# Patient Record
Sex: FEMALE | Race: WHITE | NOT HISPANIC OR LATINO | Employment: FULL TIME | URBAN - METROPOLITAN AREA
[De-identification: names, ages, dates, MRNs, and addresses within clinical notes are randomized per-mention and may not be internally consistent; named-entity substitution may affect disease eponyms.]

---

## 2019-06-11 ENCOUNTER — EMPLOYEE HEALTH (OUTPATIENT)
Dept: OCCUPATIONAL MEDICINE | Facility: CLINIC | Age: 34
End: 2019-06-11

## 2019-06-11 ENCOUNTER — HOSPITAL ENCOUNTER (OUTPATIENT)
Facility: MEDICAL CENTER | Age: 34
End: 2019-06-11
Attending: PREVENTIVE MEDICINE
Payer: COMMERCIAL

## 2019-06-11 ENCOUNTER — EH NON-PROVIDER (OUTPATIENT)
Dept: OCCUPATIONAL MEDICINE | Facility: CLINIC | Age: 34
End: 2019-06-11

## 2019-06-11 VITALS
WEIGHT: 139 LBS | OXYGEN SATURATION: 94 % | SYSTOLIC BLOOD PRESSURE: 120 MMHG | DIASTOLIC BLOOD PRESSURE: 84 MMHG | HEART RATE: 60 BPM | BODY MASS INDEX: 22.34 KG/M2 | TEMPERATURE: 97.7 F | HEIGHT: 66 IN

## 2019-06-11 DIAGNOSIS — Z02.1 PRE-EMPLOYMENT HEALTH SCREENING EXAMINATION: ICD-10-CM

## 2019-06-11 DIAGNOSIS — Z02.1 PRE-EMPLOYMENT DRUG SCREENING: ICD-10-CM

## 2019-06-11 LAB
AMP AMPHETAMINE: NORMAL
BAR BARBITURATES: NORMAL
BZO BENZODIAZEPINES: NORMAL
COC COCAINE: NORMAL
INT CON NEG: NORMAL
INT CON POS: NORMAL
MDMA ECSTASY: NORMAL
MET METHAMPHETAMINES: NORMAL
MTD METHADONE: NORMAL
OPI OPIATES: NORMAL
OXY OXYCODONE: NORMAL
PCP PHENCYCLIDINE: NORMAL
POC URINE DRUG SCREEN OCDRS: NEGATIVE
THC: NORMAL

## 2019-06-11 PROCEDURE — 94375 RESPIRATORY FLOW VOLUME LOOP: CPT | Performed by: PREVENTIVE MEDICINE

## 2019-06-11 PROCEDURE — 86480 TB TEST CELL IMMUN MEASURE: CPT | Performed by: PREVENTIVE MEDICINE

## 2019-06-11 PROCEDURE — 8915 PR COMPREHENSIVE PHYSICAL: Performed by: PREVENTIVE MEDICINE

## 2019-06-11 PROCEDURE — 80305 DRUG TEST PRSMV DIR OPT OBS: CPT | Performed by: PREVENTIVE MEDICINE

## 2019-06-11 ASSESSMENT — VISUAL ACUITY
OS_CC: 20/20
OD_CC: 20/15

## 2019-06-12 LAB
GAMMA INTERFERON BACKGROUND BLD IA-ACNC: 0.03 IU/ML
M TB IFN-G BLD-IMP: NEGATIVE
M TB IFN-G CD4+ BCKGRND COR BLD-ACNC: 0 IU/ML
MITOGEN IGNF BCKGRD COR BLD-ACNC: >10 IU/ML
QFT TB2 - NIL TBQ2: 0 IU/ML

## 2019-06-14 ENCOUNTER — EH NON-PROVIDER (OUTPATIENT)
Dept: OCCUPATIONAL MEDICINE | Facility: CLINIC | Age: 34
End: 2019-06-14

## 2019-06-14 DIAGNOSIS — Z71.85 IMMUNIZATION COUNSELING: ICD-10-CM

## 2019-06-14 NOTE — PROGRESS NOTES
Pre-employment medical surveillance reviewed and signed. Quantiferon result documented in immunizations. Document returned to assigned MA.

## 2019-08-16 ENCOUNTER — HOSPITAL ENCOUNTER (OUTPATIENT)
Dept: LAB | Facility: MEDICAL CENTER | Age: 34
End: 2019-08-16
Payer: COMMERCIAL

## 2019-09-19 ENCOUNTER — HOSPITAL ENCOUNTER (OUTPATIENT)
Dept: LAB | Facility: MEDICAL CENTER | Age: 34
End: 2019-09-19
Payer: COMMERCIAL

## 2019-09-19 LAB
BDY FAT % MEASURED: 22 %
BP DIAS: 75 MMHG
BP SYS: 118 MMHG
CHOLEST SERPL-MCNC: 193 MG/DL (ref 100–199)
DIABETES HTDIA: NO
EVENT NAME HTEVT: NORMAL
FASTING HTFAS: YES
GLUCOSE SERPL-MCNC: 71 MG/DL (ref 65–99)
HDLC SERPL-MCNC: 108 MG/DL
HYPERTENSION HTHYP: NO
LDLC SERPL CALC-MCNC: 70 MG/DL
SCREENING LOC CITY HTCIT: NORMAL
SCREENING LOC STATE HTSTA: NORMAL
SCREENING LOCATION HTLOC: NORMAL
SMOKING HTSMO: NO
SUBSCRIBER ID HTSID: NORMAL
TRIGL SERPL-MCNC: 73 MG/DL (ref 0–149)

## 2019-09-19 PROCEDURE — 82947 ASSAY GLUCOSE BLOOD QUANT: CPT

## 2019-09-19 PROCEDURE — S5190 WELLNESS ASSESSMENT BY NONPH: HCPCS

## 2019-09-19 PROCEDURE — 36415 COLL VENOUS BLD VENIPUNCTURE: CPT

## 2019-09-19 PROCEDURE — 80061 LIPID PANEL: CPT

## 2019-10-06 ENCOUNTER — NON-PROVIDER VISIT (OUTPATIENT)
Dept: URGENT CARE | Facility: CLINIC | Age: 34
End: 2019-10-06
Payer: COMMERCIAL

## 2019-10-06 DIAGNOSIS — Z23 NEED FOR INFLUENZA VACCINATION: ICD-10-CM

## 2019-10-06 PROCEDURE — 90471 IMMUNIZATION ADMIN: CPT | Performed by: PHYSICIAN ASSISTANT

## 2019-10-06 PROCEDURE — 90686 IIV4 VACC NO PRSV 0.5 ML IM: CPT | Performed by: PHYSICIAN ASSISTANT

## 2019-10-14 ENCOUNTER — DOCUMENTATION (OUTPATIENT)
Dept: OCCUPATIONAL MEDICINE | Facility: CLINIC | Age: 34
End: 2019-10-14

## 2019-10-16 ENCOUNTER — EH NON-PROVIDER (OUTPATIENT)
Dept: OCCUPATIONAL MEDICINE | Facility: CLINIC | Age: 34
End: 2019-10-16

## 2019-10-16 DIAGNOSIS — Z02.89 ENCOUNTER FOR OCCUPATIONAL HEALTH EXAMINATION INVOLVING RESPIRATOR: ICD-10-CM

## 2019-10-16 PROCEDURE — 94375 RESPIRATORY FLOW VOLUME LOOP: CPT | Performed by: NURSE PRACTITIONER

## 2019-12-03 ENCOUNTER — APPOINTMENT (OUTPATIENT)
Dept: DERMATOLOGY | Facility: IMAGING CENTER | Age: 34
End: 2019-12-03

## 2019-12-27 ENCOUNTER — HOSPITAL ENCOUNTER (OUTPATIENT)
Dept: RADIOLOGY | Facility: MEDICAL CENTER | Age: 34
End: 2019-12-27
Attending: PHYSICIAN ASSISTANT
Payer: COMMERCIAL

## 2019-12-27 ENCOUNTER — OFFICE VISIT (OUTPATIENT)
Dept: URGENT CARE | Facility: PHYSICIAN GROUP | Age: 34
End: 2019-12-27
Payer: COMMERCIAL

## 2019-12-27 DIAGNOSIS — J22 LOWER RESP. TRACT INFECTION: ICD-10-CM

## 2019-12-27 DIAGNOSIS — R05.9 COUGH: ICD-10-CM

## 2019-12-27 PROCEDURE — 71046 X-RAY EXAM CHEST 2 VIEWS: CPT

## 2019-12-27 RX ORDER — DOXYCYCLINE HYCLATE 100 MG
100 TABLET ORAL 2 TIMES DAILY
Qty: 20 TAB | Refills: 0 | Status: SHIPPED | OUTPATIENT
Start: 2019-12-27 | End: 2020-01-06

## 2019-12-27 NOTE — PROGRESS NOTES
Pt 35 y/o female- well known to myself- cough, congestion, sore throat, and intermittent wheezing for the last month-symptoms are waxing and waning in nature.  Reports 2 days ago symptoms significantly worsened as she was in bed all day.    Strep and flu negative in clinic.  Coughing throughout duration of the shift today.     POX 98% on RA.   Lungs- coarse rhonchi left lower lung field.   CXR- clear.     Will tx for lower resp. Infection.   Doxycycline for 10 days written- sent to pharmacy.

## 2020-03-11 ENCOUNTER — TELEPHONE (OUTPATIENT)
Dept: SCHEDULING | Facility: IMAGING CENTER | Age: 35
End: 2020-03-11

## 2020-03-11 ENCOUNTER — OFFICE VISIT (OUTPATIENT)
Dept: MEDICAL GROUP | Facility: MEDICAL CENTER | Age: 35
End: 2020-03-11
Payer: COMMERCIAL

## 2020-03-11 VITALS
HEART RATE: 80 BPM | HEIGHT: 66 IN | DIASTOLIC BLOOD PRESSURE: 70 MMHG | WEIGHT: 140 LBS | OXYGEN SATURATION: 91 % | BODY MASS INDEX: 22.5 KG/M2 | TEMPERATURE: 98.3 F | SYSTOLIC BLOOD PRESSURE: 112 MMHG

## 2020-03-11 DIAGNOSIS — Z00.00 ROUTINE GENERAL MEDICAL EXAMINATION AT A HEALTH CARE FACILITY: ICD-10-CM

## 2020-03-11 DIAGNOSIS — L70.0 ACNE VULGARIS: ICD-10-CM

## 2020-03-11 DIAGNOSIS — Z15.09 BRCA2 GENE MUTATION POSITIVE: ICD-10-CM

## 2020-03-11 DIAGNOSIS — F32.A DEPRESSION, UNSPECIFIED DEPRESSION TYPE: ICD-10-CM

## 2020-03-11 DIAGNOSIS — Z15.01 BRCA2 GENE MUTATION POSITIVE: ICD-10-CM

## 2020-03-11 PROCEDURE — 99395 PREV VISIT EST AGE 18-39: CPT | Performed by: INTERNAL MEDICINE

## 2020-03-11 RX ORDER — BUPROPION HYDROCHLORIDE 150 MG/1
150 TABLET, EXTENDED RELEASE ORAL 2 TIMES DAILY
Qty: 60 TAB | Refills: 11 | Status: SHIPPED | OUTPATIENT
Start: 2020-03-11 | End: 2020-03-13

## 2020-03-11 SDOH — HEALTH STABILITY: MENTAL HEALTH: HOW MANY STANDARD DRINKS CONTAINING ALCOHOL DO YOU HAVE ON A TYPICAL DAY?: 1 OR 2

## 2020-03-11 ASSESSMENT — PATIENT HEALTH QUESTIONNAIRE - PHQ9
SUM OF ALL RESPONSES TO PHQ QUESTIONS 1-9: 6
CLINICAL INTERPRETATION OF PHQ2 SCORE: 1
5. POOR APPETITE OR OVEREATING: 0 - NOT AT ALL

## 2020-03-11 NOTE — LETTER
[facility]  Bebo Ayala M.D.  [address]  [facility] [address]  Fax: [phone]   Authorization for Release/Disclosure of   Protected Health Information   Name: AMERICA TRUJILLO : 1985 SSN: [id number]   [facility] [address] Phone:    [phone] (home)    I authorize the entity listed below to release/disclose the PHI below to:   [facility]/Bebo Ayala M.D. and Bebo Ayala M.D.   Provider or Entity Name: [facility]       Address [address], [place] [address]    Phone:   [phone]       Fax:      Reason for request: continuity of care   Information to be released:    [  ] LAST COLONOSCOPY,  including any PATH REPORT and follow-up  [  ] LAST FIT/COLOGUARD RESULT [  ] LAST DEXA  [  ] LAST MAMMOGRAM  [  ] LAST PAP  [  ] LAST LABS [  ] RETINA EXAM REPORT  [  ] IMMUNIZATION RECORDS  [ X ] Release all info      [  ] Check here and initial the line next to each item to release ALL health information INCLUDING  _____ Care and treatment for drug and / or alcohol abuse  _____ HIV testing, infection status, or AIDS  _____ Genetic Testing    DATES OF SERVICE OR TIME PERIOD TO BE DISCLOSED: _____________  I understand and acknowledge that:  * This Authorization may be revoked at any time by you in writing, except if your health information has already been used or disclosed.  * Your health information that will be used or disclosed as a result of you signing this authorization could be re-disclosed by the recipient. If this occurs, your re-disclosed health information may no longer be protected by State or Federal laws.  * You may refuse to sign this Authorization. Your refusal will not affect your ability to obtain treatment.  * This Authorization becomes effective upon signing and will  on (date) __________.      If no date is indicated, this Authorization will  one (1) year from the signature date.       Name: Saritha Leiva    Signature:   Date:     3/11/2020       PLEASE FAX REQUESTED RECORDS BACK TO: [phone]

## 2020-03-11 NOTE — ASSESSMENT & PLAN NOTE
She has a BRCA2 gene and has had prophylactic bilateral mastectomy.  She would like to be followed by a gynecologist.

## 2020-03-11 NOTE — ASSESSMENT & PLAN NOTE
She has a history of episodes of depression.  She has been on many different antidepressants.  The one that seems to work best is Wellbutrin SR and she would like to take 150 mg once or twice a day.

## 2020-03-11 NOTE — PROGRESS NOTES
Chief Complaint:     Saritha Leiva is a 34 y.o. female who presents for annual exam and to establish herself in this office and for follow-up of depression and BRCA2 gene    BRCA2 gene mutation positive  She has a BRCA2 gene and has had prophylactic bilateral mastectomy.  She would like to be followed by a gynecologist.    Depression  She has a history of episodes of depression.  She has been on many different antidepressants.  The one that seems to work best is Wellbutrin SR and she would like to take 150 mg once or twice a day.    Acne vulgaris  She has chronic problems with acne.  She will be calling a dermatologist for follow-up.      Pt has GYN provider: yes  Last colonoscopy: Not yet  Bone density test no  Gardiasil:yes   Last Td: 6/2/10  Regular exercise: yes     She  has a past medical history of Acne vulgaris (3/11/2020), Anesthesia, Depression, and Pain. She also has no past medical history of Anginal syndrome (HCC), Arrhythmia, Asthma, At risk for sleep apnea, Blood clotting disorder (HCC), Breath shortness, Bronchitis, Cancer (HCC), Carcinoma in situ of respiratory system, Cold, Congestive heart failure (HCC), COPD (chronic obstructive pulmonary disease) (HCC), Coughing blood, Dental disorder, Diabetes (HCC), Disorder of thyroid, Emphysema of lung (HCC), Gynecological disorder, Heart murmur, Heart valve disease, Hemorrhagic disorder (HCC), Hepatitis A, Hepatitis B, Hepatitis C, High cholesterol, Hypertension, Myocardial infarct (HCC), Pacemaker, Pneumonia, Pregnant, Rheumatic fever, Seizure (HCC), Sleep apnea, Snoring, Stroke (HCC), Tuberculosis, Urinary bladder disorder, or Urinary incontinence.  She  has a past surgical history that includes acl reconstruction w/ anterolateral ligament repair (Right, 12/22/2015); meniscus repair (12/22/2015); knee arthroscopy with bone tunnel grafting (12/22/2015); acl reconstruction w/ anterolateral ligament repair (Right, 3/29/2016); acl repair (Bilateral);  and mastectomy bilateral subq.  Current Outpatient Medications   Medication Sig Dispense Refill   • buPROPion SR (WELLBUTRIN-SR) 150 MG TABLET SR 12 HR sustained-release tablet Take 1 Tab by mouth 2 times a day. 60 Tab 11   • norethindrone (MICRONOR) 0.35 MG tablet Take 1 Tab by mouth every day.     • diazepam (VALIUM) 5 MG Tab Take 1 Tab by mouth every 6 hours as needed (muscle spasms). (Patient not taking: Reported on 6/11/2019) 15 Tab 0     No current facility-administered medications for this visit.      Social History     Tobacco Use   • Smoking status: Never Smoker   • Smokeless tobacco: Never Used   Substance Use Topics   • Alcohol use: Yes     Alcohol/week: 1.2 oz     Types: 2 Glasses of wine per week     Drinks per session: 1 or 2     Comment: occas   • Drug use: No         ROS:   General:  no recent change in strength, weight, appetite, or exercise tolerance.  CNS: no significant lightheadedness, headaches, fainting spells, seizures, or change in mentation.  EENT: no significant change in vision, hearing, sense of smell, swallowing, or voice.  RESP: no significant wheezing, coughing, or dyspnea.  Breasts: No visual changes, masses, retractions or discharge.  CV: no significant palpitations or chest pain.  G.I.: no significant indigestion, abdominal pain, or change in bowel habits.  : no significant change in urinating, no dysuria or hematuria.  GYN: no significant vaginal discharge, unusual bleeding, discomfort, pelvic pain, or change in sexual function.  EXTREM:  no significant edema, swelling, pain, or limitations of motion.  INTEG: no significant change in skin, hair or fingernails.  LYMPH: no significant adenopathy or other masses.  PSYCH: no significant anxiety or depression.      PHYSICAL EXAMINATION:  Body mass index is 22.6 kg/m².  Wt Readings from Last 4 Encounters:   03/11/20 63.5 kg (140 lb)   03/29/16 64.4 kg (141 lb 15.6 oz)   12/22/15 65.4 kg (144 lb 2.9 oz)   06/02/12 64.4 kg (142 lb)  "      PE:  /70 (BP Location: Left arm, Patient Position: Sitting)   Pulse 80   Temp 36.8 °C (98.3 °F)   Ht 1.676 m (5' 6\")   Wt 63.5 kg (140 lb)   SpO2 91%   BMI 22.60 kg/m²     GEN: clean, well groomed, in no acute distress, alert.  EENT: PERRL, normal EOMs, fundi unremarkable, normal external auditory canals and tympanic membranes, pharynx unremarkable, dentition satisfactory, nose unremarkable, neck supple and without significant lymphadenopathy or masses, trachea midline, thyroid unremarkable.  LUNGS: bilateral breath sounds, without significant wheezes or rales or abnormalities in percussion.  CV:  peripheral circulation is satisfactory, pedal pulses are satisfactory, heart sounds are unremarkable.  ABD: soft, without significant masses, no hepatosplenomegaly, no tenderness, bowel sounds are normal, no significant inguinal adenopathy.  Breast, pelvic, and rectal exams were not performed.  Again, she has had bilateral mastectomy.    EXTREM:  without significant edema, cyanosis or deformity.  LYMPH:  no significant lymphadenopathy or lymphedema.  INTEG:  skin, hair, fingernails are unremarkable without significant rashes or lesions  NEURO:  essentially normal sensation, strength, gate, and cerebellar function, normal DTRs, affect is normal, oriented times three.  PSYCH: appropriate affect and mood.    ASSESSMENT/PLAN:  1. BRCA2 gene mutation positive  REFERRAL TO GYNECOLOGY    Follow-up with gynecologist.  We discussed dangers of ovarian cancer.   2. Depression, unspecified depression type  REFERRAL TO BEHAVIORAL HEALTH    I gave her a prescription for Wellbutrin  mg to be taken once or twice daily.  We discussed potential side effects.  Follow-up in 3 months if not sooner.  In the meantime she was referred to behavioral health services for counseling.  She reports that that often helps.   3. Routine general medical examination at a health care facility     4. Acne vulgaris      She will be " following up with a dermatologist.       Labs per orders  Immunizations per orders  Pt counseled about skin care, diet, supplements, and exercise.  Next office visit for recheck of chronic medical conditions is due in 3 months

## 2020-03-13 RX ORDER — BUPROPION HYDROCHLORIDE 150 MG/1
150 TABLET, EXTENDED RELEASE ORAL 2 TIMES DAILY
Qty: 60 TAB | Refills: 11 | Status: SHIPPED | OUTPATIENT
Start: 2020-03-13 | End: 2021-01-21

## 2020-03-20 ENCOUNTER — OFFICE VISIT (OUTPATIENT)
Dept: DERMATOLOGY | Facility: IMAGING CENTER | Age: 35
End: 2020-03-20
Payer: COMMERCIAL

## 2020-03-20 ENCOUNTER — TELEPHONE (OUTPATIENT)
Dept: DERMATOLOGY | Facility: IMAGING CENTER | Age: 35
End: 2020-03-20

## 2020-03-20 VITALS — TEMPERATURE: 98.4 F | BODY MASS INDEX: 21.86 KG/M2 | HEIGHT: 66 IN | WEIGHT: 136 LBS

## 2020-03-20 DIAGNOSIS — Z51.81 MEDICATION MONITORING ENCOUNTER: ICD-10-CM

## 2020-03-20 DIAGNOSIS — L70.0 ACNE VULGARIS: ICD-10-CM

## 2020-03-20 LAB
INT CON NEG: NEGATIVE
INT CON POS: POSITIVE
POC URINE PREGNANCY TEST: NEGATIVE

## 2020-03-20 PROCEDURE — 99202 OFFICE O/P NEW SF 15 MIN: CPT | Performed by: NURSE PRACTITIONER

## 2020-03-20 PROCEDURE — 81025 URINE PREGNANCY TEST: CPT | Performed by: NURSE PRACTITIONER

## 2020-03-20 ASSESSMENT — ENCOUNTER SYMPTOMS
CHILLS: 0
FEVER: 0
DIAPHORESIS: 0
WEIGHT LOSS: 0

## 2020-03-20 NOTE — PROGRESS NOTES
Dermatology New Patient Visit    Chief Complaint   Patient presents with   • Establish Care       Subjective:     HPI:   Saritha Leiva is a 34 y.o. female presenting for    Acne  Started: 8 months ago it restarted. Previously on Accutane  Active on: face  Aggravated by: dairy  Treatment currently used: no  Prior treatments used:accutane, last cycle was around 3851-0773   Face wash/moisturizer: cerave  Family history of scarring acne: no  Contraception: IUD, mirena  Family h/o breast/uterine/ovarian cancers: Yes, mom breast cancer. Pt had a prophylactic mastectomy because she is BRCA 2 positive.          Past Medical History:   Diagnosis Date   • Acne vulgaris 3/11/2020   • Anesthesia     ponv,motion sickness   • Depression    • Pain        Current Outpatient Medications on File Prior to Visit   Medication Sig Dispense Refill   • buPROPion SR (WELLBUTRIN-SR) 150 MG TABLET SR 12 HR sustained-release tablet Take 1 Tab by mouth 2 times a day. 60 Tab 11   • norethindrone (MICRONOR) 0.35 MG tablet Take 1 Tab by mouth every day.     • diazepam (VALIUM) 5 MG Tab Take 1 Tab by mouth every 6 hours as needed (muscle spasms). (Patient not taking: Reported on 6/11/2019) 15 Tab 0     No current facility-administered medications on file prior to visit.        Allergies   Allergen Reactions   • Bactrim [Sulfamethoxazole W-Trimethoprim]      rash       Family History   Problem Relation Age of Onset   • Cancer Mother         Stage 4 Breast   • Other Father         Bi Polar   • Hypertension Father        Social History     Socioeconomic History   • Marital status: Single     Spouse name: Not on file   • Number of children: Not on file   • Years of education: Not on file   • Highest education level: Not on file   Occupational History   • Not on file   Social Needs   • Financial resource strain: Not on file   • Food insecurity     Worry: Not on file     Inability: Not on file   • Transportation needs     Medical: Not on file      "Non-medical: Not on file   Tobacco Use   • Smoking status: Never Smoker   • Smokeless tobacco: Never Used   Substance and Sexual Activity   • Alcohol use: Yes     Alcohol/week: 1.2 oz     Types: 2 Glasses of wine per week     Drinks per session: 1 or 2     Comment: occas   • Drug use: No   • Sexual activity: Not on file   Lifestyle   • Physical activity     Days per week: Not on file     Minutes per session: Not on file   • Stress: Not on file   Relationships   • Social connections     Talks on phone: Not on file     Gets together: Not on file     Attends [employer] service: Not on file     Active member of club or organization: Not on file     Attends meetings of clubs or organizations: Not on file     Relationship status: Not on file   • Intimate partner violence     Fear of current or ex partner: Not on file     Emotionally abused: Not on file     Physically abused: Not on file     Forced sexual activity: Not on file   Other Topics Concern   • Not on file   Social History Narrative   • Not on file       Review of Systems   Constitutional: Negative for chills, diaphoresis, fever, malaise/fatigue and weight loss.   Skin: Negative for itching and rash.        Objective:     A focused cutaneous exam was completed including: face, eyelids, conjunctiva, lips and neck with the following pertinent findings listed below. Remaining above-listed examined areas within normal limits / negative for rashes or lesions.    Temp 36.9 °C (98.4 °F)   Ht 1.676 m (5' 6\")   Wt 61.7 kg (136 lb)     Physical Exam   Constitutional: She is oriented to person, place, and time and well-developed, well-nourished, and in no distress. No distress.   HENT:   Head: Normocephalic.   few erythematous papules, zero pustules, few open and closed comedones to lower face and upper neck. pih noted    Chest and back are spared     Pulmonary/Chest: Effort normal.   Neurological: She is alert and oriented to person, place, and time.   Skin: Skin is warm " and dry. No rash noted. There is erythema.   Psychiatric: Mood normal.       DATA: none applicable to review    Assessment and Plan:     Acne vulgaris - severe, inflammatory, scaring  - educated patient about diagnosis, management options, and expectations of treatment   - Introductory packet for isotretinoin treatment provided and reviewed.    I extensively discussed the potential adverse effects of treatment, including, but not limited to, teratogenicity, ocular disturbances, bony abnormailities, lipid disturbances, elevated LFTs/liver inflammation, reported associations with IBD, depression, endocrine disturbances, hematologic abnormalities, and myopathy. More common side effects of dry skin/eyes/mucosa, photosensitivity, sticky skin, hair loss, fragile nails, paronychia, myalgias, fatigue, headache, and abdominal pain/nausea/diarrhea were also reviewed.  I additionally discussed the need for two forms of birth control, (or abstinence), and the need to confirm/initiate these two forms of birth control for at least one month prior to starting treatment. We discussed the need to comply with pregnancy testing before, during, and after treatment (monthy, continued until one month after completion of course), which coincides with her necessary monthly visits to the office throughout the duration of treatment. Patient is aware she cannot share the medication, and she cannot donate blood during treatment, and through one month after completion of treatment.  - we discussed the need for baseline blood work, and follow-up blood work once treatment is initiated    - pt aware to notify me asap if any new/concerning symptoms  - lubricating eye drops, nasal spray, vaseline  - dose: isotretinoin 60 mg daily   - ipledge ID: pending. Call placed to ipledge to re-register   Goal dose: 120-150mg/kg; 61.7 kg = 7404mg-9255mg; total cumulative dose on day of evaluation: 0mg  Urine poct negative today    2. Medication monitoring  encounter    - CBC WITH DIFFERENTIAL; Future  - Comp Metabolic Panel; Future  - HCG,QUAL (OUTPATIENT ONLY); Future  - Lipid Profile; Future  - POCT PREGNANCY      Followup: Return in about 2 months (around 5/20/2020) for isotretinoin or sooner for concerns.    PRESTON Diaz.

## 2020-03-20 NOTE — TELEPHONE ENCOUNTER
Attempted to call [employer] to see if the patient can use her old iPledge ID number (and to find out what it is if so) from 2014 or if we need to provide her with a new iPledge ID number. They are currently updating their phone systems and told me to call back in 1 hours time.

## 2020-04-02 ENCOUNTER — TELEPHONE (OUTPATIENT)
Dept: DERMATOLOGY | Facility: IMAGING CENTER | Age: 35
End: 2020-04-02

## 2020-04-02 NOTE — TELEPHONE ENCOUNTER
Im covering today at derm , questioning if I can register patient on ipledge ? She is a new start , please advise

## 2020-04-03 ENCOUNTER — TELEPHONE (OUTPATIENT)
Dept: DERMATOLOGY | Facility: IMAGING CENTER | Age: 35
End: 2020-04-03

## 2020-04-03 NOTE — TELEPHONE ENCOUNTER
On pt's paperwork that is scanned in she wrote the wrong number down, the correct ID number is [id number]. Pt notified.

## 2020-05-08 ENCOUNTER — HOSPITAL ENCOUNTER (OUTPATIENT)
Dept: LAB | Facility: MEDICAL CENTER | Age: 35
End: 2020-05-08
Attending: NURSE PRACTITIONER
Payer: COMMERCIAL

## 2020-05-08 DIAGNOSIS — Z51.81 MEDICATION MONITORING ENCOUNTER: ICD-10-CM

## 2020-05-08 DIAGNOSIS — L70.0 ACNE VULGARIS: ICD-10-CM

## 2020-05-08 LAB
ALBUMIN SERPL BCP-MCNC: 4.6 G/DL (ref 3.2–4.9)
ALBUMIN/GLOB SERPL: 1.8 G/DL
ALP SERPL-CCNC: 73 U/L (ref 30–99)
ALT SERPL-CCNC: 31 U/L (ref 2–50)
ANION GAP SERPL CALC-SCNC: 13 MMOL/L (ref 7–16)
AST SERPL-CCNC: 39 U/L (ref 12–45)
BASOPHILS # BLD AUTO: 0.8 % (ref 0–1.8)
BASOPHILS # BLD: 0.05 K/UL (ref 0–0.12)
BILIRUB SERPL-MCNC: 0.4 MG/DL (ref 0.1–1.5)
BUN SERPL-MCNC: 19 MG/DL (ref 8–22)
CALCIUM SERPL-MCNC: 10.1 MG/DL (ref 8.5–10.5)
CHLORIDE SERPL-SCNC: 99 MMOL/L (ref 96–112)
CHOLEST SERPL-MCNC: 171 MG/DL (ref 100–199)
CO2 SERPL-SCNC: 27 MMOL/L (ref 20–33)
CREAT SERPL-MCNC: 0.97 MG/DL (ref 0.5–1.4)
EOSINOPHIL # BLD AUTO: 0.12 K/UL (ref 0–0.51)
EOSINOPHIL NFR BLD: 1.8 % (ref 0–6.9)
ERYTHROCYTE [DISTWIDTH] IN BLOOD BY AUTOMATED COUNT: 47.6 FL (ref 35.9–50)
GLOBULIN SER CALC-MCNC: 2.5 G/DL (ref 1.9–3.5)
GLUCOSE SERPL-MCNC: 75 MG/DL (ref 65–99)
HCG SERPL QL: NEGATIVE
HCT VFR BLD AUTO: 43.2 % (ref 37–47)
HDLC SERPL-MCNC: 101 MG/DL
HGB BLD-MCNC: 14.3 G/DL (ref 12–16)
IMM GRANULOCYTES # BLD AUTO: 0.02 K/UL (ref 0–0.11)
IMM GRANULOCYTES NFR BLD AUTO: 0.3 % (ref 0–0.9)
LDLC SERPL CALC-MCNC: 44 MG/DL
LYMPHOCYTES # BLD AUTO: 1.74 K/UL (ref 1–4.8)
LYMPHOCYTES NFR BLD: 26.4 % (ref 22–41)
MCH RBC QN AUTO: 33 PG (ref 27–33)
MCHC RBC AUTO-ENTMCNC: 33.1 G/DL (ref 33.6–35)
MCV RBC AUTO: 99.8 FL (ref 81.4–97.8)
MONOCYTES # BLD AUTO: 0.44 K/UL (ref 0–0.85)
MONOCYTES NFR BLD AUTO: 6.7 % (ref 0–13.4)
NEUTROPHILS # BLD AUTO: 4.23 K/UL (ref 2–7.15)
NEUTROPHILS NFR BLD: 64 % (ref 44–72)
NRBC # BLD AUTO: 0 K/UL
NRBC BLD-RTO: 0 /100 WBC
PLATELET # BLD AUTO: 240 K/UL (ref 164–446)
PMV BLD AUTO: 10 FL (ref 9–12.9)
POTASSIUM SERPL-SCNC: 4.6 MMOL/L (ref 3.6–5.5)
PROT SERPL-MCNC: 7.1 G/DL (ref 6–8.2)
RBC # BLD AUTO: 4.33 M/UL (ref 4.2–5.4)
SODIUM SERPL-SCNC: 139 MMOL/L (ref 135–145)
TRIGL SERPL-MCNC: 129 MG/DL (ref 0–149)
WBC # BLD AUTO: 6.6 K/UL (ref 4.8–10.8)

## 2020-05-08 PROCEDURE — 36415 COLL VENOUS BLD VENIPUNCTURE: CPT

## 2020-05-08 PROCEDURE — 80053 COMPREHEN METABOLIC PANEL: CPT

## 2020-05-08 PROCEDURE — 85025 COMPLETE CBC W/AUTO DIFF WBC: CPT

## 2020-05-08 PROCEDURE — 84703 CHORIONIC GONADOTROPIN ASSAY: CPT

## 2020-05-08 PROCEDURE — 80061 LIPID PANEL: CPT

## 2020-05-11 ENCOUNTER — TELEPHONE (OUTPATIENT)
Dept: DERMATOLOGY | Facility: IMAGING CENTER | Age: 35
End: 2020-05-11

## 2020-05-11 DIAGNOSIS — L70.0 ACNE VULGARIS: ICD-10-CM

## 2020-05-11 DIAGNOSIS — Z79.899 ENCOUNTER FOR LONG-TERM CURRENT USE OF HIGH RISK MEDICATION: ICD-10-CM

## 2020-05-11 RX ORDER — ISOTRETINOIN 30 MG/1
1 CAPSULE ORAL DAILY
Qty: 30 CAP | Refills: 0 | Status: SHIPPED | OUTPATIENT
Start: 2020-05-11 | End: 2020-07-07

## 2020-05-11 NOTE — TELEPHONE ENCOUNTER
Please let pt know that her labs are normal. She has been on accutane before. Her ipledge number is [id number].  All I need is one more negative HCG and then I can start accutane. I can put an order in Epic or she can send me a pic of negative home HCG test.

## 2020-05-11 NOTE — TELEPHONE ENCOUNTER
Pt was confimed in iPledge and pt may receive accutane.    Isotretinoin sent to pharmacy on file. Pt is to take 30mg daily and do labs in 30 days. Orders are in Epic.

## 2020-05-12 NOTE — TELEPHONE ENCOUNTER
I called pt, no answer. She needs to reschedule her follow up appt to 30 days from now, her orders are in the computer for next month. Stop all other meds for acne. She needs to log into her [employer] to answer her questions.

## 2020-05-15 ENCOUNTER — TELEPHONE (OUTPATIENT)
Dept: DERMATOLOGY | Facility: IMAGING CENTER | Age: 35
End: 2020-05-15

## 2020-05-15 DIAGNOSIS — Z79.899 ENCOUNTER FOR LONG-TERM CURRENT USE OF HIGH RISK MEDICATION: ICD-10-CM

## 2020-05-15 NOTE — TELEPHONE ENCOUNTER
I called pt back. She said [facility] told they never received our Rx. I let her know we did send it and it was 5/11. However pt missed her window and now needs to wait 19 days from her last hcg. Per iPledge she can do another hcg 5/27. Pt understood. She wants to do at at lab because she works near one. Forward to Judy to order urine hcg.

## 2020-05-15 NOTE — TELEPHONE ENCOUNTER
Pt called stated that [facility] where accutane rx was sent is saying they do not have the medication or any of her information on file. Pt worried that this will throw off the window for  and will need another pregnancy test.   Please review and advise patient. She will be near a lab today if she does need labs for pregnancy test again just need to submit labs and she'll do it.

## 2020-05-29 ENCOUNTER — HOSPITAL ENCOUNTER (OUTPATIENT)
Dept: LAB | Facility: MEDICAL CENTER | Age: 35
End: 2020-05-29
Attending: NURSE PRACTITIONER
Payer: COMMERCIAL

## 2020-05-29 ENCOUNTER — TELEPHONE (OUTPATIENT)
Dept: DERMATOLOGY | Facility: IMAGING CENTER | Age: 35
End: 2020-05-29

## 2020-05-29 DIAGNOSIS — L70.0 ACNE VULGARIS: ICD-10-CM

## 2020-05-29 DIAGNOSIS — Z79.899 ENCOUNTER FOR LONG-TERM CURRENT USE OF HIGH RISK MEDICATION: ICD-10-CM

## 2020-05-29 LAB — HCG UR QL: NEGATIVE

## 2020-05-29 PROCEDURE — 81025 URINE PREGNANCY TEST: CPT

## 2020-05-29 RX ORDER — ISOTRETINOIN 30 MG/1
1 CAPSULE ORAL DAILY
Qty: 30 CAP | Refills: 0 | Status: SHIPPED | OUTPATIENT
Start: 2020-05-29 | End: 2020-07-07 | Stop reason: SDUPTHER

## 2020-05-29 NOTE — TELEPHONE ENCOUNTER
Neg HCG results. Isotretinoin sent to pharmacy. Update ipledge. Advised pt to do her questions in ipledge. Labs ordered and to be done in 30 days

## 2020-06-10 ENCOUNTER — HOSPITAL ENCOUNTER (OUTPATIENT)
Dept: RADIOLOGY | Facility: MEDICAL CENTER | Age: 35
End: 2020-06-10
Attending: NURSE PRACTITIONER | Admitting: NURSE PRACTITIONER
Payer: COMMERCIAL

## 2020-06-10 DIAGNOSIS — R10.2 PELVIC PAIN: ICD-10-CM

## 2020-06-10 PROCEDURE — 76830 TRANSVAGINAL US NON-OB: CPT

## 2020-06-10 PROCEDURE — 74018 RADEX ABDOMEN 1 VIEW: CPT

## 2020-07-03 ENCOUNTER — HOSPITAL ENCOUNTER (OUTPATIENT)
Dept: LAB | Facility: MEDICAL CENTER | Age: 35
End: 2020-07-03
Attending: NURSE PRACTITIONER
Payer: COMMERCIAL

## 2020-07-03 LAB
ALBUMIN SERPL BCP-MCNC: 4.9 G/DL (ref 3.2–4.9)
ALP SERPL-CCNC: 62 U/L (ref 30–99)
ALT SERPL-CCNC: 28 U/L (ref 2–50)
AST SERPL-CCNC: 33 U/L (ref 12–45)
BILIRUB CONJ SERPL-MCNC: <0.2 MG/DL (ref 0.1–0.5)
BILIRUB INDIRECT SERPL-MCNC: NORMAL MG/DL (ref 0–1)
BILIRUB SERPL-MCNC: 0.6 MG/DL (ref 0.1–1.5)
CHOLEST SERPL-MCNC: 178 MG/DL (ref 100–199)
FASTING STATUS PATIENT QL REPORTED: NORMAL
HCG SERPL QL: NEGATIVE
HDLC SERPL-MCNC: 110 MG/DL
LDLC SERPL CALC-MCNC: 56 MG/DL
PROT SERPL-MCNC: 7.3 G/DL (ref 6–8.2)
TRIGL SERPL-MCNC: 61 MG/DL (ref 0–149)

## 2020-07-03 PROCEDURE — 80061 LIPID PANEL: CPT

## 2020-07-03 PROCEDURE — 36415 COLL VENOUS BLD VENIPUNCTURE: CPT

## 2020-07-03 PROCEDURE — 80076 HEPATIC FUNCTION PANEL: CPT

## 2020-07-03 PROCEDURE — 84703 CHORIONIC GONADOTROPIN ASSAY: CPT

## 2020-07-07 ENCOUNTER — TELEPHONE (OUTPATIENT)
Dept: DERMATOLOGY | Facility: IMAGING CENTER | Age: 35
End: 2020-07-07

## 2020-07-07 ENCOUNTER — TELEMEDICINE (OUTPATIENT)
Dept: DERMATOLOGY | Facility: IMAGING CENTER | Age: 35
End: 2020-07-07
Payer: COMMERCIAL

## 2020-07-07 DIAGNOSIS — Z51.81 MEDICATION MONITORING ENCOUNTER: ICD-10-CM

## 2020-07-07 DIAGNOSIS — L70.0 ACNE VULGARIS: ICD-10-CM

## 2020-07-07 PROCEDURE — 99212 OFFICE O/P EST SF 10 MIN: CPT | Mod: 95,CR | Performed by: NURSE PRACTITIONER

## 2020-07-07 RX ORDER — ISOTRETINOIN 30 MG/1
1 CAPSULE ORAL 2 TIMES DAILY
Qty: 60 CAP | Refills: 0 | Status: SHIPPED | OUTPATIENT
Start: 2020-07-07 | End: 2020-08-13 | Stop reason: SDUPTHER

## 2020-07-07 ASSESSMENT — ENCOUNTER SYMPTOMS
DIAPHORESIS: 0
WEIGHT LOSS: 0
CHILLS: 0
FEVER: 0

## 2020-07-08 NOTE — PROGRESS NOTES
Dermatology return Patient Visit    As a means of avoiding spread of COVID-19, this visit is being conducted by televisit. This encounter was conducted via [employer].   Verbal consent was obtained. Patient's identity was verified.      Chief Complaint   Patient presents with   • Follow-Up     accutane       Subjective:     HPI:   Saritha Leiva is a 34 y.o. female presenting for    Acne  Started: 8 months ago it restarted. Previously on Accutane  Active on: face  Aggravated by: dairy  Treatment currently used: no  Prior treatments used:accutane, last cycle was around 0943-9266   Face wash/moisturizer: cerave  Family history of scarring acne: no  Contraception: IUD, mirena  Family h/o breast/uterine/ovarian cancers: Yes, mom breast cancer. Pt had a prophylactic mastectomy because she is BRCA 2 positive.          Past Medical History:   Diagnosis Date   • Acne vulgaris 3/11/2020   • Anesthesia     ponv,motion sickness   • Depression    • Pain        Current Outpatient Medications on File Prior to Visit   Medication Sig Dispense Refill   • buPROPion SR (WELLBUTRIN-SR) 150 MG TABLET SR 12 HR sustained-release tablet Take 1 Tab by mouth 2 times a day. 60 Tab 11   • norethindrone (MICRONOR) 0.35 MG tablet Take 1 Tab by mouth every day.     • diazepam (VALIUM) 5 MG Tab Take 1 Tab by mouth every 6 hours as needed (muscle spasms). (Patient not taking: Reported on 6/11/2019) 15 Tab 0     No current facility-administered medications on file prior to visit.        Allergies   Allergen Reactions   • Bactrim [Sulfamethoxazole W-Trimethoprim]      rash       Family History   Problem Relation Age of Onset   • Cancer Mother         Stage 4 Breast   • Other Father         Bi Polar   • Hypertension Father        Social History     Socioeconomic History   • Marital status: Single     Spouse name: Not on file   • Number of children: Not on file   • Years of education: Not on file   • Highest education level: Not on file    Occupational History   • Not on file   Social Needs   • Financial resource strain: Not on file   • Food insecurity     Worry: Not on file     Inability: Not on file   • Transportation needs     Medical: Not on file     Non-medical: Not on file   Tobacco Use   • Smoking status: Never Smoker   • Smokeless tobacco: Never Used   Substance and Sexual Activity   • Alcohol use: Yes     Alcohol/week: 1.2 oz     Types: 2 Glasses of wine per week     Drinks per session: 1 or 2     Comment: occas   • Drug use: No   • Sexual activity: Not on file   Lifestyle   • Physical activity     Days per week: Not on file     Minutes per session: Not on file   • Stress: Not on file   Relationships   • Social connections     Talks on phone: Not on file     Gets together: Not on file     Attends Zoroastrianism service: Not on file     Active member of club or organization: Not on file     Attends meetings of clubs or organizations: Not on file     Relationship status: Not on file   • Intimate partner violence     Fear of current or ex partner: Not on file     Emotionally abused: Not on file     Physically abused: Not on file     Forced sexual activity: Not on file   Other Topics Concern   • Not on file   Social History Narrative   • Not on file       Review of Systems   Constitutional: Negative for chills, diaphoresis, fever, malaise/fatigue and weight loss.   Skin: Negative for itching and rash.        Objective:     A focused cutaneous exam was completed including: face, eyelids, conjunctiva, lips and neck with the following pertinent findings listed below. Remaining above-listed examined areas within normal limits / negative for rashes or lesions.    There were no vitals taken for this visit.    Physical Exam   Constitutional: She is oriented to person, place, and time and well-developed, well-nourished, and in no distress. No distress.   HENT:   Head: Normocephalic.   few erythematous papules, zero pustules, few open and closed comedones to  lower face and upper neck. pih noted    Chest and back are spared     Pulmonary/Chest: Effort normal.   Neurological: She is alert and oriented to person, place, and time.   Skin: Skin is warm and dry. No rash noted. There is erythema.   Psychiatric: Mood normal.       DATA: none applicable to review    Assessment and Plan:     Acne vulgaris - severe, inflammatory, scaring  - re-educated patient about diagnosis, management options, and expectations of treatment  - discussed the potential adverse effects of treatment, including, but not limited to, teratogenicity, ocular disturbances, bony abnormailities, lipid disturbances, elevated LFTs/liver inflammation, reported associations with IBD, depression, endocrine disturbances, hematologic abnormalities, and myopathy. More common side effects of dry skin/eyes/mucosa, photosensitivity, sticky skin, hair loss, fragile nails, paronychia, myalgias, fatigue, headache, and abdominal pain/nausea/diarrhea were also reviewed.  I additionally discussed the need for two forms of birth control, (or abstinence), and the need to confirm/initiate these two forms of birth control for at least one month prior to starting treatment. We discussed the need to comply with pregnancy testing before, during, and after treatment (monthy, continued until one month after completion of course), which coincides with her necessary monthly visits to the office throughout the duration of treatment. Patient is aware she cannot share the medication, and she cannot donate blood during treatment, and through one month after completion of treatment.  - we discussed the need for baseline blood work, and follow-up blood work once treatment is initiated    - pt aware to notify me asap if any new/concerning symptoms  - lubricating eye drops, nasal spray, vaseline  - dose: isotretinoin 60 mg daily   - ipledge ID: pending. [id number]  Goal dose: 120-150mg/kg; 61.7 kg = 7404mg-9255mg; total cumulative dose on day  of evaluation: 2700mg    IUD and male condoms    2. Medication monitoring encounter    -lipids  -ALT/AST  -Qual HCG    Followup: Return in about 1 month (around 8/7/2020) for accutane or sooner for any concerns.    PRESTON Diaz.

## 2020-08-11 ENCOUNTER — TELEPHONE (OUTPATIENT)
Dept: DERMATOLOGY | Facility: IMAGING CENTER | Age: 35
End: 2020-08-11

## 2020-08-11 DIAGNOSIS — Z79.899 ENCOUNTER FOR LONG-TERM CURRENT USE OF HIGH RISK MEDICATION: ICD-10-CM

## 2020-08-12 ENCOUNTER — HOSPITAL ENCOUNTER (OUTPATIENT)
Dept: LAB | Facility: MEDICAL CENTER | Age: 35
End: 2020-08-12
Attending: NURSE PRACTITIONER
Payer: COMMERCIAL

## 2020-08-12 ENCOUNTER — TELEMEDICINE (OUTPATIENT)
Dept: MEDICAL GROUP | Facility: MEDICAL CENTER | Age: 35
End: 2020-08-12
Payer: COMMERCIAL

## 2020-08-12 VITALS
RESPIRATION RATE: 16 BRPM | TEMPERATURE: 98.7 F | OXYGEN SATURATION: 100 % | BODY MASS INDEX: 22.5 KG/M2 | SYSTOLIC BLOOD PRESSURE: 124 MMHG | HEART RATE: 61 BPM | HEIGHT: 66 IN | WEIGHT: 140 LBS | DIASTOLIC BLOOD PRESSURE: 84 MMHG

## 2020-08-12 DIAGNOSIS — G47.9 SLEEP DISTURBANCE: ICD-10-CM

## 2020-08-12 DIAGNOSIS — F32.A DEPRESSION, UNSPECIFIED DEPRESSION TYPE: ICD-10-CM

## 2020-08-12 DIAGNOSIS — F43.9 STRESS: ICD-10-CM

## 2020-08-12 LAB
ALBUMIN SERPL BCP-MCNC: 5.1 G/DL (ref 3.2–4.9)
ALP SERPL-CCNC: 70 U/L (ref 30–99)
ALT SERPL-CCNC: 29 U/L (ref 2–50)
AST SERPL-CCNC: 32 U/L (ref 12–45)
BILIRUB CONJ SERPL-MCNC: <0.2 MG/DL (ref 0.1–0.5)
BILIRUB INDIRECT SERPL-MCNC: ABNORMAL MG/DL (ref 0–1)
BILIRUB SERPL-MCNC: 0.6 MG/DL (ref 0.1–1.5)
CHOLEST SERPL-MCNC: 189 MG/DL (ref 100–199)
FASTING STATUS PATIENT QL REPORTED: NORMAL
HCG SERPL QL: NEGATIVE
HDLC SERPL-MCNC: 105 MG/DL
LDLC SERPL CALC-MCNC: 74 MG/DL
PROT SERPL-MCNC: 7.3 G/DL (ref 6–8.2)
TRIGL SERPL-MCNC: 52 MG/DL (ref 0–149)

## 2020-08-12 PROCEDURE — 99213 OFFICE O/P EST LOW 20 MIN: CPT | Mod: 95,CR | Performed by: INTERNAL MEDICINE

## 2020-08-12 PROCEDURE — 36415 COLL VENOUS BLD VENIPUNCTURE: CPT

## 2020-08-12 PROCEDURE — 80076 HEPATIC FUNCTION PANEL: CPT

## 2020-08-12 PROCEDURE — 84703 CHORIONIC GONADOTROPIN ASSAY: CPT

## 2020-08-12 PROCEDURE — 80061 LIPID PANEL: CPT

## 2020-08-12 RX ORDER — TRAZODONE HYDROCHLORIDE 50 MG/1
50 TABLET ORAL
Qty: 20 TAB | Refills: 0 | Status: SHIPPED | OUTPATIENT
Start: 2020-08-12 | End: 2020-10-29

## 2020-08-12 ASSESSMENT — FIBROSIS 4 INDEX: FIB4 SCORE: 0.83

## 2020-08-12 NOTE — TELEPHONE ENCOUNTER
Please call pt and let her know that labs have been ordered. We will need to schedule an appt for lab review and re-check per isotretinoin protocol.

## 2020-08-12 NOTE — ASSESSMENT & PLAN NOTE
She is under a large amount of stress especially recently with a close friend committing suicide, her mother has metastatic breast cancer and has just been placed on hospice.  Saritha is a primary caregiver.  A friend's mother recently passed away unexpectedly.  She is seeing a therapist.  She wonders about seeing a psychiatrist.

## 2020-08-12 NOTE — ASSESSMENT & PLAN NOTE
Because of all of the stress that she has been under recently she is having difficulty sleeping and would like to have something to help her sleep.  She does continue taking the Wellbutrin.

## 2020-08-12 NOTE — ASSESSMENT & PLAN NOTE
She has a history of depression for which she is on Wellbutrin.  She is not sure whether that is doing an adequate job with recent stressors in her life.  She is seeing a psychologist and wonders about seeing a psychiatrist.

## 2020-08-12 NOTE — PROGRESS NOTES
This encounter was conducted via Zoom meeting  Verbal consent was obtained. Patient's identity was verified.       CC  Stress and depression.                                                                                                                                     HPI:   Saritha presents today with the following Resulting in stress and difficulty sleeping and worsening of her depression..    1. Sleep disturbance  Comments:  Because of all of the stress that she has been under recently she is having difficulty sleeping and would like to have something to help her sleep.  She does continue taking the Wellbutrin.      2. Stress  Comments: She is under a large amount of stress especially recently with a close friend committing suicide, her mother has metastatic breast cancer and has just been placed on hospice.  Saritha is a primary caregiver.  A friend's mother recently passed away unexpectedly.  She is seeing a therapist.  She wonders about seeing a psychiatrist.      3. Depression, unspecified depression type  Comments: She has a history of depression for which she is on Wellbutrin.  She is not sure whether that is doing an adequate job with recent stressors in her life.  She is seeing a psychologist and wonders about seeing a psychiatrist.          Patient Active Problem List    Diagnosis Date Noted   • Sleep disturbance 08/12/2020   • Stress 08/12/2020   • BRCA2 gene mutation positive 03/11/2020   • Depression 03/11/2020   • Routine general medical examination at a health care facility 03/11/2020   • Acne vulgaris 03/11/2020   • Follow-up examination after orthopedic surgery 01/04/2016   • ACL graft tear (HCC) 11/02/2015   • Medial meniscus tear 11/02/2015       Current Outpatient Medications   Medication Sig Dispense Refill   • traZODone (DESYREL) 50 MG Tab Take 1 Tab by mouth at bedtime as needed for Sleep. 20 Tab 0   • buPROPion SR (WELLBUTRIN-SR) 150 MG TABLET SR 12 HR sustained-release tablet Take 1  "Tab by mouth 2 times a day. 60 Tab 11   • Isotretinoin 30 MG Cap Take 1 Cap by mouth 2 Times a Day. 60 Cap 0   • norethindrone (MICRONOR) 0.35 MG tablet Take 1 Tab by mouth every day.     • diazepam (VALIUM) 5 MG Tab Take 1 Tab by mouth every 6 hours as needed (muscle spasms). (Patient not taking: Reported on 6/11/2019) 15 Tab 0     No current facility-administered medications for this visit.          Allergies as of 08/12/2020 - Reviewed 08/12/2020   Allergen Reaction Noted   • Bactrim [sulfamethoxazole w-trimethoprim]  06/11/2019        ROS:  Denies, chest pain, Shortness of breath, Edema.  She is having difficulty sleeping as noted above.     /84 (BP Location: Left arm)   Pulse 61   Temp 37.1 °C (98.7 °F)   Resp 16   Ht 1.676 m (5' 6\")   Wt 63.5 kg (140 lb)   SpO2 100%   BMI 22.60 kg/m²       Physical Exam:  Constitutional: Alert, no distress, well-groomed.  Skin: No rashes in visible areas.  Eye: Round. Conjunctiva clear, lNo icterus.   ENMT: Lips pink without lesions, good dentition, moist mucous membranes. Phonation normal.  Neck: No masses, no thyromegaly. Moves freely without pain.  CV: Pulse as reported by patient  Respiratory: Unlabored respiratory effort, no cough or audible wheeze  Psych: Alert and oriented x3, normal affect and mood.          Assessment and Plan.   34 y.o. female with the following issues.    1. Sleep disturbance  She was encouraged to get some regular exercise and to follow-up closely with her therapist.  I gave her a small prescription of trazodone to help her sleep.  We discussed that further refills of that should probably come from her psychiatrist.    2. Stress  We reviewed ways of coping with stress including exercise and meditation.  She will follow-up with her therapist and probably with a psychiatrist as recommended.    3. Depression, unspecified depression type  She is on Wellbutrin and will continue that for now.  She will discuss with her therapist about seeing " a psychiatrist which I think would be a good idea.      Follow-up: She will keep us informed of how she is doing.  We should probably see her in the next 2 to 3 months if not sooner.  Again she will follow-up closely with her therapist and psychiatrist.

## 2020-08-13 ENCOUNTER — TELEMEDICINE (OUTPATIENT)
Dept: DERMATOLOGY | Facility: IMAGING CENTER | Age: 35
End: 2020-08-13
Payer: COMMERCIAL

## 2020-08-13 ENCOUNTER — TELEPHONE (OUTPATIENT)
Dept: DERMATOLOGY | Facility: IMAGING CENTER | Age: 35
End: 2020-08-13

## 2020-08-13 DIAGNOSIS — Z79.899 ENCOUNTER FOR LONG-TERM CURRENT USE OF HIGH RISK MEDICATION: ICD-10-CM

## 2020-08-13 DIAGNOSIS — L70.0 ACNE VULGARIS: ICD-10-CM

## 2020-08-13 DIAGNOSIS — L85.3 XEROSIS OF SKIN: ICD-10-CM

## 2020-08-13 PROCEDURE — 99213 OFFICE O/P EST LOW 20 MIN: CPT | Mod: 95,CR | Performed by: DERMATOLOGY

## 2020-08-13 RX ORDER — ISOTRETINOIN 30 MG/1
1 CAPSULE ORAL 2 TIMES DAILY
Qty: 60 CAP | Refills: 0 | Status: SHIPPED | OUTPATIENT
Start: 2020-08-13 | End: 2020-09-11 | Stop reason: SDUPTHER

## 2020-08-13 NOTE — PROGRESS NOTES
"CC: acne    As a means of avoiding spread of COVID-19, this visit is being conducted by video. This video visit was initiated by the patient and they verbally consented to Zoom    Subjective: Previously seen patient here for accutane f/u. Patient of AE.    Hx of once daily X 1 month and BID X 1 month  No nosebleeds, does occasionally get scabs in nose.  Using nasal emollient.  sens to sun noted but wearing sunscreens.   No hematochezia or depression.  No abd pain, back pain.     2 forms - IUD and condoms     From prior notes:  \"Acne  Started: 8 months ago it restarted. Previously on Accutane  Active on: face  Aggravated by: dairy  Treatment currently used: no  Prior treatments used:accutane, last cycle was around 5829-8074   Face wash/moisturizer: cerave  Family history of scarring acne: no  Contraception: IUD, mirena  Family h/o breast/uterine/ovarian cancers: Yes, mom breast cancer. Pt had a prophylactic mastectomy because she is BRCA 2 positive.  \"    ROS: no fevers/chills. No itch.  No cough  DermPMH: no skin cancer/melanoma  No problem-specific Assessment & Plan notes found for this encounter.    Relevant PMH: hx depression - controlled.  No IBD.  Mirena IUD present  Social: never smoker    PE: Gen:WDWN female in NAD.  Exam - somewhat limited by teletechnology-video.   Skin: limited visibility of face from patient video - in car with strong backlighting.  No visible papules/pustules.  Not magnified well enough to see comedones.  Neck/upper chest - appearing spared.    Labs: negative pregnancy test yesterday  Chol/ALT - WNL    A/P:  Acne, previously severe:  ipledge ID: [id number]  Goal dose: 120-150mg/kg; 61.7 kg = 7404mg-9255mg; total cumulative dose on day of evaluation: 4500mg  -reviewed ipledge counseling and approved in ipledge  -rf Accutane 30 mg PO BID  -f/u 1 month labs and clinic visit    Anticipatory care of xerosis:  -cont moisturizer use and dry eyes/lips care    I have reviewed medications relevant " to my specialty.    F/u 1 month      edited

## 2020-08-20 ENCOUNTER — HOSPITAL ENCOUNTER (OUTPATIENT)
Facility: MEDICAL CENTER | Age: 35
End: 2020-08-20
Attending: OBSTETRICS & GYNECOLOGY
Payer: COMMERCIAL

## 2020-08-20 ENCOUNTER — GYNECOLOGY VISIT (OUTPATIENT)
Dept: OBGYN | Facility: CLINIC | Age: 35
End: 2020-08-20
Payer: COMMERCIAL

## 2020-08-20 VITALS
SYSTOLIC BLOOD PRESSURE: 118 MMHG | HEIGHT: 66 IN | WEIGHT: 136 LBS | DIASTOLIC BLOOD PRESSURE: 79 MMHG | BODY MASS INDEX: 21.86 KG/M2

## 2020-08-20 DIAGNOSIS — Q51.3 BICORNUATE UTERUS: ICD-10-CM

## 2020-08-20 DIAGNOSIS — Z12.4 SCREENING FOR CERVICAL CANCER: ICD-10-CM

## 2020-08-20 PROCEDURE — 87591 N.GONORRHOEAE DNA AMP PROB: CPT

## 2020-08-20 PROCEDURE — 99203 OFFICE O/P NEW LOW 30 MIN: CPT | Performed by: OBSTETRICS & GYNECOLOGY

## 2020-08-20 PROCEDURE — 87491 CHLMYD TRACH DNA AMP PROBE: CPT

## 2020-08-20 PROCEDURE — 88175 CYTOPATH C/V AUTO FLUID REDO: CPT

## 2020-08-20 ASSESSMENT — FIBROSIS 4 INDEX: FIB4 SCORE: 0.79

## 2020-08-20 NOTE — PROGRESS NOTES
" Saritha Pace y.o.  female presents for Annual Well Woman Exam.    ROS: Patient is feeling well. No dyspnea or chest pain on exertion. No Abdominal pain, change in bowel habits, black or bloody stools. No urinary sx. GYN ROS:  , No menses , on IUD , RLQ pain , resolved , but found breast pain or new or enlarging lumps on self exam, she complains of prior RL. Denies breast tenderness, mass, discharge, changes in size or contour, or abnormal cyclic discomfort., S/P mastectomy , reconstruction implants  No neurological complaints.   Past Medical History:   Diagnosis Date   • Acne vulgaris 3/11/2020   • Anesthesia     ponv,motion sickness   • Depression    • Pain    • Sleep disturbance 2020   • Stress 2020     IUD Usage  Allergy:   Bactrim [sulfamethoxazole w-trimethoprim]    LMP:       No LMP recorded. .     Menstrual History: no menses yet  Contraceptive Method:IUD      Objective : The patient appears well, alert and oriented x 3, in no acute distress.  /79   Ht 1.676 m (5' 6\")   Wt 61.7 kg (136 lb)   HEENT Exam: EOMI, LUCINDA, no adenopathy or thyromegaly.  Lungs: Clear to Auscultation and Percussion.  Cor: S1 and S2 normal, no murmurs, or rubs   Abdomen: Soft without tenderness, guarding mass or organomegaly.  Extremities: No edema, pulses equal  Neurological: Normal No focal signs  Breast Exam:Inspection negative. No nipple discharge or bleeding. No masses or nodularity palpable, positive findings: bilateral implants  Pelvic: External genitalia,urethral meatus, urethra, bladder and vagina normal. Cervix, uterus and adnexa intact and normal, IUD string noted , anterior small symmetric utersu > .  Anus and perineum normal. Bimanual and rectovaginal without masses or tenderness.    Lab:  Recent Results (from the past 336 hour(s))   HEPATIC FUNCTION PANEL    Collection Time: 20  9:50 AM   Result Value Ref Range    Alkaline Phosphatase 70 30 - 99 U/L    AST(SGOT) 32 12 - 45 U/L    " ALT(SGPT) 29 2 - 50 U/L    Total Bilirubin 0.6 0.1 - 1.5 mg/dL    Direct Bilirubin <0.2 0.1 - 0.5 mg/dL    Indirect Bilirubin see below 0.0 - 1.0 mg/dL    Albumin 5.1 (H) 3.2 - 4.9 g/dL    Total Protein 7.3 6.0 - 8.2 g/dL   Lipid Profile    Collection Time: 08/12/20  9:50 AM   Result Value Ref Range    Cholesterol,Tot 189 100 - 199 mg/dL    Triglycerides 52 0 - 149 mg/dL     >=40 mg/dL    LDL 74 <100 mg/dL   FASTING STATUS    Collection Time: 08/12/20  9:50 AM   Result Value Ref Range    Fasting Status Fasting    HCG,QUAL (OUTPATIENT ONLY)    Collection Time: 08/12/20  9:50 AM   Result Value Ref Range    Beta-Hcg Qualitative Serum Negative Negative       Assessment:  well woman  IUD in situ   Question of uterine anomaly : not appreciated clinically   Spent 30 minutes minutes with the patient ; Face to Face, with >50% of this time spent in counseling and coordination of care, surrounding the above mentioned issues as well as: discuss ovarian status , future pregnancies . And uterine anomaly . Patient undecided   Plan:  Pap   Need MRI to assess uterus   RTC to discuss

## 2020-08-20 NOTE — NON-PROVIDER
Pt here NP annual exam  Pt states was having pelvic pain, U/S showed bicornate uterus  Good#[phone]  Pharmacy verified

## 2020-08-21 LAB
C TRACH DNA GENITAL QL NAA+PROBE: NEGATIVE
CYTOLOGY REG CYTOL: NORMAL
N GONORRHOEA DNA GENITAL QL NAA+PROBE: NEGATIVE
SPECIMEN SOURCE: NORMAL

## 2020-09-10 ENCOUNTER — HOSPITAL ENCOUNTER (OUTPATIENT)
Dept: LAB | Facility: MEDICAL CENTER | Age: 35
End: 2020-09-10
Attending: NURSE PRACTITIONER
Payer: COMMERCIAL

## 2020-09-10 ENCOUNTER — HOSPITAL ENCOUNTER (OUTPATIENT)
Dept: RADIOLOGY | Facility: MEDICAL CENTER | Age: 35
End: 2020-09-10
Attending: OBSTETRICS & GYNECOLOGY
Payer: COMMERCIAL

## 2020-09-10 ENCOUNTER — HOSPITAL ENCOUNTER (OUTPATIENT)
Dept: LAB | Facility: MEDICAL CENTER | Age: 35
End: 2020-09-10
Payer: COMMERCIAL

## 2020-09-10 LAB
ALBUMIN SERPL BCP-MCNC: 4.6 G/DL (ref 3.2–4.9)
ALP SERPL-CCNC: 63 U/L (ref 30–99)
ALT SERPL-CCNC: 28 U/L (ref 2–50)
AST SERPL-CCNC: 36 U/L (ref 12–45)
BDY FAT % MEASURED: 22.8 %
BILIRUB CONJ SERPL-MCNC: <0.2 MG/DL (ref 0.1–0.5)
BILIRUB INDIRECT SERPL-MCNC: NORMAL MG/DL (ref 0–1)
BILIRUB SERPL-MCNC: 0.5 MG/DL (ref 0.1–1.5)
BP DIAS: 85 MMHG
BP SYS: 121 MMHG
CHOLEST SERPL-MCNC: 176 MG/DL (ref 100–199)
CHOLEST SERPL-MCNC: 178 MG/DL (ref 100–199)
DIABETES HTDIA: NO
EVENT NAME HTEVT: NORMAL
FASTING HTFAS: YES
FASTING STATUS PATIENT QL REPORTED: NORMAL
GLUCOSE SERPL-MCNC: 72 MG/DL (ref 65–99)
HDLC SERPL-MCNC: 94 MG/DL
HDLC SERPL-MCNC: 95 MG/DL
HYPERTENSION HTHYP: NO
LDLC SERPL CALC-MCNC: 69 MG/DL
LDLC SERPL CALC-MCNC: 71 MG/DL
PROT SERPL-MCNC: 6.8 G/DL (ref 6–8.2)
SCREENING LOC CITY HTCIT: NORMAL
SCREENING LOC STATE HTSTA: NORMAL
SCREENING LOCATION HTLOC: NORMAL
SMOKING HTSMO: NO
SUBSCRIBER ID HTSID: NORMAL
TRIGL SERPL-MCNC: 60 MG/DL (ref 0–149)
TRIGL SERPL-MCNC: 63 MG/DL (ref 0–149)

## 2020-09-10 PROCEDURE — 700117 HCHG RX CONTRAST REV CODE 255: Performed by: OBSTETRICS & GYNECOLOGY

## 2020-09-10 PROCEDURE — 80076 HEPATIC FUNCTION PANEL: CPT

## 2020-09-10 PROCEDURE — 80061 LIPID PANEL: CPT

## 2020-09-10 PROCEDURE — 80061 LIPID PANEL: CPT | Mod: 91

## 2020-09-10 PROCEDURE — 72197 MRI PELVIS W/O & W/DYE: CPT

## 2020-09-10 PROCEDURE — A9576 INJ PROHANCE MULTIPACK: HCPCS | Performed by: OBSTETRICS & GYNECOLOGY

## 2020-09-10 PROCEDURE — 36415 COLL VENOUS BLD VENIPUNCTURE: CPT

## 2020-09-10 PROCEDURE — 82947 ASSAY GLUCOSE BLOOD QUANT: CPT

## 2020-09-10 PROCEDURE — S5190 WELLNESS ASSESSMENT BY NONPH: HCPCS

## 2020-09-10 RX ADMIN — GADOTERIDOL 10 ML: 279.3 INJECTION, SOLUTION INTRAVENOUS at 08:49

## 2020-09-11 ENCOUNTER — TELEPHONE (OUTPATIENT)
Dept: DERMATOLOGY | Facility: IMAGING CENTER | Age: 35
End: 2020-09-11

## 2020-09-11 ENCOUNTER — TELEMEDICINE (OUTPATIENT)
Dept: DERMATOLOGY | Facility: IMAGING CENTER | Age: 35
End: 2020-09-11
Payer: COMMERCIAL

## 2020-09-11 DIAGNOSIS — L70.0 ACNE VULGARIS: ICD-10-CM

## 2020-09-11 DIAGNOSIS — Z79.899 ENCOUNTER FOR LONG-TERM CURRENT USE OF HIGH RISK MEDICATION: ICD-10-CM

## 2020-09-11 LAB
INT CON NEG: NEGATIVE
INT CON POS: POSITIVE
POC URINE PREGNANCY TEST: NEGATIVE

## 2020-09-11 PROCEDURE — 99213 OFFICE O/P EST LOW 20 MIN: CPT | Mod: 95,CR | Performed by: NURSE PRACTITIONER

## 2020-09-11 PROCEDURE — 81025 URINE PREGNANCY TEST: CPT | Performed by: NURSE PRACTITIONER

## 2020-09-11 RX ORDER — ISOTRETINOIN 30 MG/1
1 CAPSULE ORAL 2 TIMES DAILY
Qty: 60 CAP | Refills: 0 | Status: SHIPPED | OUTPATIENT
Start: 2020-09-11 | End: 2020-10-26 | Stop reason: SDUPTHER

## 2020-09-11 ASSESSMENT — ENCOUNTER SYMPTOMS
WEIGHT LOSS: 0
DIAPHORESIS: 0
CHILLS: 0
FEVER: 0

## 2020-09-11 NOTE — TELEPHONE ENCOUNTER
MYKEL to schedule follow up in 4 weeks with Judy and to collect copay for virtual visit from today.

## 2020-09-11 NOTE — PROGRESS NOTES
Dermatology return Patient Visit    As a means of avoiding spread of COVID-19, this visit is being conducted by [employer]. This encounter was conducted via [employer].   Verbal consent was obtained. Patient's identity was verified.      Chief Complaint   Patient presents with   • Follow-Up     acne and taking isotretinoin       Subjective:     HPI:   Saritha Leiva is a 34 y.o. female presenting for    Fewer break outs Tolerating treatment well, +dry eyes/skin, no abdominal pain, nausea/vomiting/diarrhea/blood in the stool, myalgias, arthralgias, mood disturbances, fever/sore throat/cough  Using vaseline for dry lips and nose    Completed 3 months.     History:  Acne  Started: 8 months ago it restarted. Previously on Accutane  Active on: face  Aggravated by: dairy  Treatment currently used: no  Prior treatments used:accutane, last cycle was around 9332-2444   Face wash/moisturizer: cerave  Family history of scarring acne: no  Contraception: IUD, mirena  Family h/o breast/uterine/ovarian cancers: Yes, mom breast cancer. Pt had a prophylactic mastectomy because she is BRCA 2 positive.          Past Medical History:   Diagnosis Date   • Acne vulgaris 3/11/2020   • Anesthesia     ponv,motion sickness   • Depression    • Pain    • Sleep disturbance 8/12/2020   • Stress 8/12/2020       Current Outpatient Medications on File Prior to Visit   Medication Sig Dispense Refill   • traZODone (DESYREL) 50 MG Tab Take 1 Tab by mouth at bedtime as needed for Sleep. 20 Tab 0   • buPROPion SR (WELLBUTRIN-SR) 150 MG TABLET SR 12 HR sustained-release tablet Take 1 Tab by mouth 2 times a day. 60 Tab 11     No current facility-administered medications on file prior to visit.        Allergies   Allergen Reactions   • Bactrim [Sulfamethoxazole W-Trimethoprim]      rash       Family History   Problem Relation Age of Onset   • Cancer Mother         Stage 4 Breast   • Other Father         Bi Polar   • Hypertension Father        Social  History     Socioeconomic History   • Marital status: Single     Spouse name: Not on file   • Number of children: Not on file   • Years of education: Not on file   • Highest education level: Not on file   Occupational History   • Not on file   Social Needs   • Financial resource strain: Not on file   • Food insecurity     Worry: Not on file     Inability: Not on file   • Transportation needs     Medical: Not on file     Non-medical: Not on file   Tobacco Use   • Smoking status: Never Smoker   • Smokeless tobacco: Never Used   Substance and Sexual Activity   • Alcohol use: Yes     Alcohol/week: 1.2 oz     Types: 2 Glasses of wine per week     Drinks per session: 1 or 2     Comment: occas   • Drug use: No   • Sexual activity: Yes     Partners: Male     Birth control/protection: I.U.D.   Lifestyle   • Physical activity     Days per week: Not on file     Minutes per session: Not on file   • Stress: Not on file   Relationships   • Social connections     Talks on phone: Not on file     Gets together: Not on file     Attends Sikh service: Not on file     Active member of club or organization: Not on file     Attends meetings of clubs or organizations: Not on file     Relationship status: Not on file   • Intimate partner violence     Fear of current or ex partner: Not on file     Emotionally abused: Not on file     Physically abused: Not on file     Forced sexual activity: Not on file   Other Topics Concern   • Not on file   Social History Narrative   • Not on file       Review of Systems   Constitutional: Negative for chills, diaphoresis, fever, malaise/fatigue and weight loss.   Skin: Negative for itching and rash.        Objective:     A focused cutaneous exam was completed including: face, eyelids, conjunctiva, lips and neck with the following pertinent findings listed below. Remaining above-listed examined areas within normal limits / negative for rashes or lesions.    There were no vitals taken for this  visit.    Physical Exam   Constitutional: She is oriented to person, place, and time and well-developed, well-nourished, and in no distress. No distress.   HENT:   Head: Normocephalic.   1-2 erythematous papules, zero pustules, few open and closed comedones to lower face and upper neck. pih noted    Chest and back are spared     Pulmonary/Chest: Effort normal.   Neurological: She is alert and oriented to person, place, and time.   Skin: Skin is warm and dry. No rash noted. No erythema.   Psychiatric: Mood normal.       DATA: none applicable to review    Assessment and Plan:     Acne vulgaris - severe, inflammatory, scaring  - re-educated patient about diagnosis, management options, and expectations of treatment  - discussed the potential adverse effects of treatment, including, but not limited to, teratogenicity, ocular disturbances, bony abnormailities, lipid disturbances, elevated LFTs/liver inflammation, reported associations with IBD, depression, endocrine disturbances, hematologic abnormalities, and myopathy. More common side effects of dry skin/eyes/mucosa, photosensitivity, sticky skin, hair loss, fragile nails, paronychia, myalgias, fatigue, headache, and abdominal pain/nausea/diarrhea were also reviewed.  I additionally discussed the need for two forms of birth control, (or abstinence), and the need to confirm/initiate these two forms of birth control for at least one month prior to starting treatment. We discussed the need to comply with pregnancy testing before, during, and after treatment (monthy, continued until one month after completion of course), which coincides with her necessary monthly visits to the office throughout the duration of treatment. Patient is aware she cannot share the medication, and she cannot donate blood during treatment, and through one month after completion of treatment.  - we discussed the need for baseline blood work, and follow-up blood work once treatment is initiated    -  pt aware to notify me asap if any new/concerning symptoms  - lubricating eye drops, nasal spray, vaseline  - dose: isotretinoin 60 mg daily   ipledge ID: [id number]  Goal dose: 120-150mg/kg; 61.7 kg = 7404mg-9255mg; total cumulative dose on day of evaluation: 6300mg  IUD and male condoms    2. Medication monitoring encounter    -lipids  -ALT/AST  -Qual HCG    Followup: Return in about 1 month (around 10/11/2020) for isotretinoin or sooner for any concerns.    PRESTON Diaz.

## 2020-09-15 ENCOUNTER — NON-PROVIDER VISIT (OUTPATIENT)
Dept: URGENT CARE | Facility: CLINIC | Age: 35
End: 2020-09-15

## 2020-09-15 DIAGNOSIS — Z23 NEED FOR VACCINATION: ICD-10-CM

## 2020-09-15 PROCEDURE — 90686 IIV4 VACC NO PRSV 0.5 ML IM: CPT | Performed by: PHYSICIAN ASSISTANT

## 2020-09-17 ENCOUNTER — TELEPHONE (OUTPATIENT)
Dept: OBGYN | Facility: CLINIC | Age: 35
End: 2020-09-17

## 2020-09-17 NOTE — TELEPHONE ENCOUNTER
Pt called inquiring about MRI results.  Spoke with pt and informed her they have not been reviewed yet but will send a message to the provider. Pt understood and agreed to plan. Message sent to Dr. Monahan

## 2020-09-21 ENCOUNTER — TELEPHONE (OUTPATIENT)
Dept: OBGYN | Facility: CLINIC | Age: 35
End: 2020-09-21

## 2020-09-21 NOTE — TELEPHONE ENCOUNTER
----- Message from Aniket Monahan M.D. sent at 2020 12:35 PM PDT -----  As per my review of MRI, especially in comparison to U/S done in 2020, good news is that uterus appears Septated not bicornuate , which makes it more normal .   Ovary with cyst , is more likely functional (ie not there in  , [facility]   We will need to follow this .   IUD is in place :  S it Mirena ???. As Mirena is also known to be associated with ovarian cysts.   Patient needs follow up to discuss these issues       2020 1057 Left message for pt to call back regarding MRI results.   1100 pt called back and inform her as above. Pt states she is worry because she has strong family history of breast an ovarian cancer in her family and her mom just  from breast cancer 2 weeks ago and mom was dx'd at 30 years old with cancer and she had never had a cyst and with pain and just wants to make sure. Pt states she is going out of town next week for 2 weeks but she is able to do a virtual visit and will look on her calendar she is back and will call me back.  Explained to pt I will discuss it with Dr. Monahan and see if we can do a virtual visit. Pt agreed and will call me back.   Consulted with Dr. Monahan and he agreed to do virtual visit with pt.     2020 pt called back and left message. bullard dpt back amd Left message for pt to call back.     1207 pt called back and sated she can do a virtual visit on 2020 at 0800. Pt transferred to [facility] to schedule.

## 2020-09-23 ENCOUNTER — HOSPITAL ENCOUNTER (OUTPATIENT)
Facility: MEDICAL CENTER | Age: 35
End: 2020-09-23
Attending: PHYSICIAN ASSISTANT
Payer: COMMERCIAL

## 2020-09-23 DIAGNOSIS — Z20.822 CLOSE EXPOSURE TO 2019 NOVEL CORONAVIRUS: ICD-10-CM

## 2020-09-23 DIAGNOSIS — R53.83 OTHER FATIGUE: ICD-10-CM

## 2020-09-23 LAB
COVID ORDER STATUS COVID19: NORMAL
SARS-COV-2 RNA RESP QL NAA+PROBE: NOTDETECTED
SPECIMEN SOURCE: NORMAL

## 2020-09-23 PROCEDURE — U0003 INFECTIOUS AGENT DETECTION BY NUCLEIC ACID (DNA OR RNA); SEVERE ACUTE RESPIRATORY SYNDROME CORONAVIRUS 2 (SARS-COV-2) (CORONAVIRUS DISEASE [COVID-19]), AMPLIFIED PROBE TECHNIQUE, MAKING USE OF HIGH THROUGHPUT TECHNOLOGIES AS DESCRIBED BY CMS-2020-01-R: HCPCS

## 2020-09-28 ENCOUNTER — TELEMEDICINE (OUTPATIENT)
Dept: OBGYN | Facility: CLINIC | Age: 35
End: 2020-09-28
Payer: COMMERCIAL

## 2020-09-28 DIAGNOSIS — N83.209 CYST OF OVARY, UNSPECIFIED LATERALITY: ICD-10-CM

## 2020-09-28 PROCEDURE — 99213 OFFICE O/P EST LOW 20 MIN: CPT | Mod: 95,CR | Performed by: OBSTETRICS & GYNECOLOGY

## 2020-09-28 NOTE — PROGRESS NOTES
This evaluation was conducted via Zoom using secure and encrypted videoconferencing technology. The patient was in a private location in the state of [place].    The patient's identity was confirmed and verbal consent was obtained for this virtual visit.   Time of Start : 805   Time of end : 820    34 y.o.  female , with IUD in situ , and following up for Recent MRI , which denotes   Complicated Cyst Right ovary ( 4 cm ) .   Also Septate uterus   Patient with FH of maternal ovarian Cancer , and is extreme;ly concerned regarding this   Patient as well as IUD , and no cycles :   Past Medical History:   Diagnosis Date   • Acne vulgaris 3/11/2020   • Anesthesia     ponv,motion sickness   • Depression    • Pain    • Sleep disturbance 2020   • Stress 2020     Patient Active Problem List    Diagnosis Date Noted   • BRCA2 gene mutation positive 2020     Priority: High   • Sleep disturbance 2020   • Stress 2020   • Depression 2020   • Routine general medical examination at a health care facility 2020   • Acne vulgaris 2020   • Follow-up examination after orthopedic surgery 2016   • ACL graft tear (HCC) 2015   • Medial meniscus tear 2015     Breast Cancer-related family history is not on file.  Family History   Problem Relation Age of Onset   • Cancer Mother         Stage 4 Breast   • Other Father         Bi Polar   • Hypertension Father        Labs :   9/10/2020 8:27 AM     HISTORY/REASON FOR EXAM:  Congenital malformation, uterus/cervix; Question of Septate vs biconuate > need info for counsleing        TECHNIQUE/EXAM DESCRIPTION:  MRI of the pelvis without and with contrast.     The study was performed on a Wh[employer] Signa 1.5 Leila MRI scanner.     T1 axial, T1 coronal, T2 fast spin-echo fat-suppressed axial, and T2 fast spin-echo fat-suppressed coronal images were obtained of the pelvis. Postcontrast fat-suppressed intravenous gadolinium enhanced  sequences in the axial and coronal planes were then   performed.     10 mL ProHance contrast was administered intravenously.     COMPARISON: Pelvic ultrasound 6/10/2020     FINDINGS:  Uterus:     Uterine length is 7.2 cm. Anterior to posterior dimension is 2.7 cm and mediolateral dimension is 4.2 cm. Intrauterine device is present and extends into the left moiety.     The uterus is septate as there is a muscular septum. Additionally, the upper border is convex.     Adnexa:     Within the right ovary there is a cyst containing a thin septa measuring 4.1 x 4.0 x 4.4 cm. This is likely a complicated cyst.     The left ovary is normal in appearance measuring 3.5 x 1.8 x 2.6 cm.     There is L5-S1 central disc protrusion.     IMPRESSION:     1.  Septate uterus given superior convex border and muscular septum     2.  Complicated cyst in the right ovary measuring 4.1 x 4.0 x 4.4 cm     3.  intrauterine device present and lies within the common inferior endometrium and extends into the left horn. Of note is that the right horn may not be protected from implantation.     4.  Normal appearance of the left ovary     5.  L5-S1 small central disc protrusion        Ass:   Ovarian Cyst : complicated ,   Septate Uterus   + BRCA , with FH : breast Cancer   Spent 15 minutes minutes with the patient ; Face to Face, with >100% of this time spent in counseling and coordination of care, surrounding the above mentioned issues as well as:    Follow up scan  Will do 2 cycles from prior MRI   F/U end of Nov 2020 , for results and Follow up pelvic

## 2020-10-15 ENCOUNTER — HOSPITAL ENCOUNTER (OUTPATIENT)
Dept: RADIOLOGY | Facility: MEDICAL CENTER | Age: 35
End: 2020-10-15
Attending: OBSTETRICS & GYNECOLOGY
Payer: COMMERCIAL

## 2020-10-15 DIAGNOSIS — N83.209 CYST OF OVARY, UNSPECIFIED LATERALITY: ICD-10-CM

## 2020-10-15 PROCEDURE — 76830 TRANSVAGINAL US NON-OB: CPT

## 2020-10-20 ENCOUNTER — TELEMEDICINE (OUTPATIENT)
Dept: DERMATOLOGY | Facility: IMAGING CENTER | Age: 35
End: 2020-10-20
Payer: COMMERCIAL

## 2020-10-20 DIAGNOSIS — Z79.899 ENCOUNTER FOR LONG-TERM CURRENT USE OF HIGH RISK MEDICATION: ICD-10-CM

## 2020-10-20 DIAGNOSIS — L70.0 ACNE VULGARIS: ICD-10-CM

## 2020-10-20 PROCEDURE — 99213 OFFICE O/P EST LOW 20 MIN: CPT | Mod: 95,CR | Performed by: NURSE PRACTITIONER

## 2020-10-20 ASSESSMENT — ENCOUNTER SYMPTOMS
WEIGHT LOSS: 0
FEVER: 0
CHILLS: 0
DIAPHORESIS: 0

## 2020-10-21 NOTE — PROGRESS NOTES
Dermatology return Patient Visit    As a means of avoiding spread of COVID-19, this visit is being conducted by [email or web address]. This encounter was conducted via [employer].   Verbal consent was obtained. Patient's identity was verified.      Chief Complaint   Patient presents with   • Follow-Up     on accutane       Subjective:     HPI:   Saritha Leiva is a 34 y.o. female presenting for    Fewer break outs Tolerating treatment well, +dry eyes/skin, no abdominal pain, nausea/vomiting/diarrhea/blood in the stool, myalgias, arthralgias, mood disturbances, fever/sore throat/cough  Using vaseline for dry lips and nose    History:  Acne  Started: 8 months ago it restarted. Previously on Accutane  Active on: face  Aggravated by: dairy  Treatment currently used: no  Prior treatments used:accutane, last cycle was around 4631-4826   Face wash/moisturizer: cerave  Family history of scarring acne: no  Contraception: IUD, mirena  Family h/o breast/uterine/ovarian cancers: Yes, mom breast cancer. Pt had a prophylactic mastectomy because she is BRCA 2 positive.          Past Medical History:   Diagnosis Date   • Acne vulgaris 3/11/2020   • Anesthesia     ponv,motion sickness   • Depression    • Pain    • Sleep disturbance 8/12/2020   • Stress 8/12/2020       Current Outpatient Medications on File Prior to Visit   Medication Sig Dispense Refill   • Isotretinoin 30 MG Cap Take 1 Cap by mouth 2 Times a Day. 60 Cap 0   • traZODone (DESYREL) 50 MG Tab Take 1 Tab by mouth at bedtime as needed for Sleep. 20 Tab 0   • buPROPion SR (WELLBUTRIN-SR) 150 MG TABLET SR 12 HR sustained-release tablet Take 1 Tab by mouth 2 times a day. 60 Tab 11     No current facility-administered medications on file prior to visit.        Allergies   Allergen Reactions   • Bactrim [Sulfamethoxazole W-Trimethoprim]      rash       Family History   Problem Relation Age of Onset   • Cancer Mother         Stage 4 Breast   • Other Father         Bi Polar   •  Hypertension Father        Social History     Socioeconomic History   • Marital status: Single     Spouse name: Not on file   • Number of children: Not on file   • Years of education: Not on file   • Highest education level: Not on file   Occupational History   • Not on file   Social Needs   • Financial resource strain: Not on file   • Food insecurity     Worry: Not on file     Inability: Not on file   • Transportation needs     Medical: Not on file     Non-medical: Not on file   Tobacco Use   • Smoking status: Never Smoker   • Smokeless tobacco: Never Used   Substance and Sexual Activity   • Alcohol use: Yes     Alcohol/week: 1.2 oz     Types: 2 Glasses of wine per week     Drinks per session: 1 or 2     Comment: occas   • Drug use: No   • Sexual activity: Yes     Partners: Male     Birth control/protection: I.U.D.   Lifestyle   • Physical activity     Days per week: Not on file     Minutes per session: Not on file   • Stress: Not on file   Relationships   • Social connections     Talks on phone: Not on file     Gets together: Not on file     Attends Anabaptist service: Not on file     Active member of club or organization: Not on file     Attends meetings of clubs or organizations: Not on file     Relationship status: Not on file   • Intimate partner violence     Fear of current or ex partner: Not on file     Emotionally abused: Not on file     Physically abused: Not on file     Forced sexual activity: Not on file   Other Topics Concern   • Not on file   Social History Narrative   • Not on file       Review of Systems   Constitutional: Negative for chills, diaphoresis, fever, malaise/fatigue and weight loss.   Skin: Negative for itching and rash.        Objective:     A focused cutaneous exam was completed including: face, eyelids, conjunctiva, lips and neck with the following pertinent findings listed below. Remaining above-listed examined areas within normal limits / negative for rashes or lesions.    Exam  limited by video and/or photos.       There were no vitals taken for this visit.    Physical Exam   Constitutional: She is oriented to person, place, and time and well-developed, well-nourished, and in no distress. No distress.   HENT:   Head: Normocephalic.   1-2 erythematous papules, zero pustules, no comedones appreciated through video. pih noted-improved from last visit.     Chest and back are spared-per pt     Pulmonary/Chest: Effort normal.   Neurological: She is alert and oriented to person, place, and time.   Skin: Skin is warm and dry. No rash noted. No erythema.   Psychiatric: Mood normal.       DATA: none applicable to review-pt will do labs on Thursday 10/22/20    Assessment and Plan:     Acne vulgaris - severe, inflammatory, scaring  - re-educated patient about diagnosis, management options, and expectations of treatment  - discussed the potential adverse effects of treatment, including, but not limited to, teratogenicity, ocular disturbances, bony abnormailities, lipid disturbances, elevated LFTs/liver inflammation, reported associations with IBD, depression, endocrine disturbances, hematologic abnormalities, and myopathy. More common side effects of dry skin/eyes/mucosa, photosensitivity, sticky skin, hair loss, fragile nails, paronychia, myalgias, fatigue, headache, and abdominal pain/nausea/diarrhea were also reviewed.  I additionally discussed the need for two forms of birth control, (or abstinence), and the need to confirm/initiate these two forms of birth control for at least one month prior to starting treatment. We discussed the need to comply with pregnancy testing before, during, and after treatment (monthy, continued until one month after completion of course), which coincides with her necessary monthly visits to the office throughout the duration of treatment. Patient is aware she cannot share the medication, and she cannot donate blood during treatment, and through one month after  completion of treatment.  - we discussed the need for baseline blood work, and follow-up blood work once treatment is initiated    - pt aware to notify me asap if any new/concerning symptoms  - lubricating eye drops, nasal spray, vaseline  - dose: isotretinoin 60 mg daily   ipledge ID: [id number]  Goal dose: 120-150mg/kg; 61.7 kg = 7404mg-9255mg; total cumulative dose on day of evaluation: 8100mg  IUD and male condoms    2. Medication monitoring encounter  Labs pending. Pt states she will get labs on Thursday morning.   -lipids  -ALT/AST  -Qual HCG    Followup: Return in about 1 month (around 11/20/2020) for acne or sooner for any concerns.    PRESTON Diaz.

## 2020-10-23 ENCOUNTER — HOSPITAL ENCOUNTER (OUTPATIENT)
Dept: LAB | Facility: MEDICAL CENTER | Age: 35
End: 2020-10-23
Attending: NURSE PRACTITIONER
Payer: COMMERCIAL

## 2020-10-23 LAB
ALBUMIN SERPL BCP-MCNC: 4.8 G/DL (ref 3.2–4.9)
ALP SERPL-CCNC: 71 U/L (ref 30–99)
ALT SERPL-CCNC: 24 U/L (ref 2–50)
AST SERPL-CCNC: 29 U/L (ref 12–45)
BILIRUB CONJ SERPL-MCNC: <0.2 MG/DL (ref 0.1–0.5)
BILIRUB INDIRECT SERPL-MCNC: NORMAL MG/DL (ref 0–1)
BILIRUB SERPL-MCNC: 0.4 MG/DL (ref 0.1–1.5)
CHOLEST SERPL-MCNC: 178 MG/DL (ref 100–199)
FASTING STATUS PATIENT QL REPORTED: NORMAL
HCG SERPL QL: NEGATIVE
HDLC SERPL-MCNC: 101 MG/DL
LDLC SERPL CALC-MCNC: 65 MG/DL
PROT SERPL-MCNC: 7.2 G/DL (ref 6–8.2)
TRIGL SERPL-MCNC: 60 MG/DL (ref 0–149)

## 2020-10-23 PROCEDURE — 84703 CHORIONIC GONADOTROPIN ASSAY: CPT

## 2020-10-23 PROCEDURE — 80076 HEPATIC FUNCTION PANEL: CPT

## 2020-10-23 PROCEDURE — 80061 LIPID PANEL: CPT

## 2020-10-23 PROCEDURE — 36415 COLL VENOUS BLD VENIPUNCTURE: CPT

## 2020-10-26 ENCOUNTER — TELEPHONE (OUTPATIENT)
Dept: DERMATOLOGY | Facility: IMAGING CENTER | Age: 35
End: 2020-10-26

## 2020-10-26 DIAGNOSIS — Z51.81 MEDICATION MONITORING ENCOUNTER: ICD-10-CM

## 2020-10-26 DIAGNOSIS — L70.0 ACNE VULGARIS: ICD-10-CM

## 2020-10-26 RX ORDER — ISOTRETINOIN 30 MG/1
1 CAPSULE ORAL 2 TIMES DAILY
Qty: 60 CAP | Refills: 0 | Status: SHIPPED | OUTPATIENT
Start: 2020-10-26 | End: 2022-01-10

## 2020-10-27 NOTE — TELEPHONE ENCOUNTER
Labs normal and preg test neg. Refill of Accutane sent to pharmacy on file. Please update ipledge. For next visit, she can do urine test at labs. I placed order. No new for further blood tests unless she develops symptoms.

## 2020-10-29 ENCOUNTER — TELEPHONE (OUTPATIENT)
Dept: OBGYN | Facility: CLINIC | Age: 35
End: 2020-10-29

## 2020-10-29 RX ORDER — TRAZODONE HYDROCHLORIDE 50 MG/1
TABLET ORAL
Qty: 20 TAB | Refills: 0 | Status: SHIPPED | OUTPATIENT
Start: 2020-10-29 | End: 2020-12-14

## 2020-10-29 NOTE — TELEPHONE ENCOUNTER
----- Message from Aniket Monahan M.D. sent at 10/28/2020  1:14 PM PDT -----  Repeat U/s , shows that prior complicated cyst , has now resolved . Recommend 3 month follow up so we can re assure   Report result normal to patient and follow up at normal interval     Pt notified as above pt state she did see mychart message but would like provider to order  just because on the US was not seen only on MRI and her family history of ovarian and breast cancer. Explained to pt Dr. Monahan is currently out of the office but will talk to him on Monday when he is back in the office and I will call her back. Pt agreed and verbalized understanding.     11/2/2020 consulted with Dr. Monahan and advised to call pt and heve her schedule an appt to discuss results and f/u. Not able to order  because it is not a screening test.      1130 called pt and notified as above. Pt agreed and verbalized understanding. Pt transferred to [facility] to schedule f/u with Dr. Monahan.

## 2020-12-14 RX ORDER — TRAZODONE HYDROCHLORIDE 50 MG/1
TABLET ORAL
Qty: 20 TAB | Refills: 0 | Status: SHIPPED | OUTPATIENT
Start: 2020-12-14 | End: 2022-01-10

## 2020-12-15 ENCOUNTER — GYNECOLOGY VISIT (OUTPATIENT)
Dept: OBGYN | Facility: CLINIC | Age: 35
End: 2020-12-15
Payer: COMMERCIAL

## 2020-12-15 VITALS — SYSTOLIC BLOOD PRESSURE: 137 MMHG | DIASTOLIC BLOOD PRESSURE: 85 MMHG | WEIGHT: 143 LBS | BODY MASS INDEX: 23.08 KG/M2

## 2020-12-15 DIAGNOSIS — N83.209 CYST OF OVARY, UNSPECIFIED LATERALITY: ICD-10-CM

## 2020-12-15 PROCEDURE — 99213 OFFICE O/P EST LOW 20 MIN: CPT | Performed by: OBSTETRICS & GYNECOLOGY

## 2020-12-15 ASSESSMENT — FIBROSIS 4 INDEX: FIB4 SCORE: 0.81

## 2020-12-15 NOTE — PROGRESS NOTES
35 y.o.  female previously seen for : Chief Complaint:  + BRCA, S/P Mastectomy/recnstruction , patient following up secondary to complex ovarian cyst noted on MRI     Follow up scan showed cyst resolved   Specialty Problems     None      . Patient now here in follow up. IUD : no cycles , no pain     No LMP recorded. Patient has had an implant.      Subjective: Abdominal Pain: negative    Vaginal Bleedingnegative  Menstrual Cycle: normal: negative  Dysmenorrhea:negative:   Dyspareunia:negative  Urinary Symptoms: negative   Vaginal Discharge:{No          Current Outpatient Medications:   •  traZODone (DESYREL) 50 MG Tab, TAKE 1 TABLET BY MOUTH AT BEDTIME AS NEEDED FOR SLEEP, Disp: 20 Tab, Rfl: 0  •  Isotretinoin 30 MG Cap, Take 1 Cap by mouth 2 Times a Day., Disp: 60 Cap, Rfl: 0  •  buPROPion SR (WELLBUTRIN-SR) 150 MG TABLET SR 12 HR sustained-release tablet, Take 1 Tab by mouth 2 times a day., Disp: 60 Tab, Rfl: 11  ROS: no change in ROS since visit of : 10/29/2020.  :No results found for this or any previous visit (from the past 336 hour(s)).    Vitals:    12/15/20 1436   BP: 137/85   Weight: 64.9 kg (143 lb)     Past Medical History:   Diagnosis Date   • Acne vulgaris 3/11/2020   • Anesthesia     ponv,motion sickness   • Depression    • Pain    • Sleep disturbance 2020   • Stress 2020     PGYN: IUD Usage  Social History     Socioeconomic History   • Marital status: Single     Spouse name: Not on file   • Number of children: Not on file   • Years of education: Not on file   • Highest education level: Not on file   Occupational History   • Not on file   Social Needs   • Financial resource strain: Not on file   • Food insecurity     Worry: Not on file     Inability: Not on file   • Transportation needs     Medical: Not on file     Non-medical: Not on file   Tobacco Use   • Smoking status: Never Smoker   • Smokeless tobacco: Never Used   Substance and Sexual Activity   • Alcohol use: Yes      Alcohol/week: 1.2 oz     Types: 2 Glasses of wine per week     Drinks per session: 1 or 2     Comment: occas   • Drug use: No   • Sexual activity: Yes     Partners: Male     Birth control/protection: I.U.D.   Lifestyle   • Physical activity     Days per week: Not on file     Minutes per session: Not on file   • Stress: Not on file   Relationships   • Social connections     Talks on phone: Not on file     Gets together: Not on file     Attends Christianity service: Not on file     Active member of club or organization: Not on file     Attends meetings of clubs or organizations: Not on file     Relationship status: Not on file   • Intimate partner violence     Fear of current or ex partner: Not on file     Emotionally abused: Not on file     Physically abused: Not on file     Forced sexual activity: Not on file   Other Topics Concern   • Not on file   Social History Narrative   • Not on file     Family History   Problem Relation Age of Onset   • Cancer Mother         Stage 4 Breast   • Other Father         Bi Polar   • Hypertension Father      Past Surgical History:   Procedure Laterality Date   • ACL RECONSTRUCTION W/ ANTEROLATERAL LIGAMENT REPAIR Right 3/29/2016    Procedure: RIGHT KNEE ARTHROSCOPY, ANTERIOR CRUCIATE LIGAMENT RECONSTRUCTION WITH QUADRICEPS TENDON AUTOGRAFT, ANTEROLATERAL LIGAMENT RECONSTRUCTION WITH SEMITENDINOSIS ALLOGRAFT;  Surgeon: Buddy Euceda M.D.;  Location: [facility];  Service:    • ACL RECONSTRUCTION W/ ANTEROLATERAL LIGAMENT REPAIR Right 12/22/2015    Procedure: RIGHT KNEE ARTHROSCOPY, MEDIAL AND LATERAL MENISCECTOMY ,  DEBRIDEMENT AND GRAFTING OF THE BONE TUNNELS WITH ALLOGRAFT BONE ;  Surgeon: Buddy Euceda M.D.;  Location: SURGERY [facility];  Service:    • MENISCUS REPAIR  12/22/2015    Procedure: NA ;  Surgeon: Buddy Euceda M.D.;  Location: [facility];  Service:    • KNEE ARTHROSCOPY WITH BONE TUNNEL GRAFTING  12/22/2015    Procedure: NA ;  Surgeon: Buddy BROWNE  HERIBERTO Euceda;  Location: SURGERY [facility];  Service:    • ACL REPAIR Bilateral    • MASTECTOMY BILATERAL SUBQ      BRCA 2  gene         Exam: deferred   Reading Physician Reading Date Result Priority   Heena Johnson M.D.  [phone] 10/15/2020 Routine      Narrative & Impression        10/15/2020 9:25 AM     HISTORY/REASON FOR EXAM:  Follow-up complex cyst        TECHNIQUE/EXAM DESCRIPTION:  Transabdominal and transvaginal pelvic ultrasound.     COMPARISON:   9/10/2020     FINDINGS:  Both transabdominal and transvaginal scanning were performed to optimally visualize the pelvis.     The uterus measures 2.83 cm x 6.46 cm x 3.55 cm.  The uterine myometrium is within normal limits.  The endometrial echo complex measures 0.42 cm.  Septate uterus is again noted with IUD in the left uterine horn.     Low resistive waveforms are confirmed within the ovaries.  The right ovary measures 3.70 cm x 2.31 cm x 2.41 cm.     The left ovary measures 3.01 cm x 1.38 cm x 1.65 cm.           There is no free fluid seen.     IMPRESSION:     1.  Previously described ovarian cyst has resolved.     2.  Septate uterus with intrauterine device in the left uterine horn.            Ass: Hx of multicystic ovarian cyst : functional and resolved     Spent 15 minutes minutes with the patient ; Face to Face, with >50% of this time spent in counseling and coordination of care, surrounding the above mentioned issues as well as:   P. Follow up scan , for surveillance       Discuss delivery /pregnancy again . Patient wants to switch out  Mirena in 1 yr . No plans for pregnancy   Aware of BSO recommendation , with  + BRCA     Follow up : 1 yr

## 2020-12-16 DIAGNOSIS — Z23 NEED FOR VACCINATION: ICD-10-CM

## 2020-12-18 ENCOUNTER — APPOINTMENT (OUTPATIENT)
Dept: FAMILY PLANNING/WOMEN'S HEALTH CLINIC | Facility: IMMUNIZATION CENTER | Age: 35
End: 2020-12-18
Attending: FAMILY MEDICINE
Payer: COMMERCIAL

## 2020-12-18 DIAGNOSIS — Z23 NEED FOR VACCINATION: ICD-10-CM

## 2020-12-18 PROCEDURE — 91300 PFIZER SARS-COV-2 VACCINE: CPT

## 2020-12-18 PROCEDURE — 0001A PFIZER SARS-COV-2 VACCINE: CPT

## 2020-12-19 ENCOUNTER — IMMUNIZATION (OUTPATIENT)
Dept: FAMILY PLANNING/WOMEN'S HEALTH CLINIC | Facility: IMMUNIZATION CENTER | Age: 35
End: 2020-12-19

## 2020-12-19 DIAGNOSIS — Z23 ENCOUNTER FOR VACCINATION: Primary | ICD-10-CM

## 2020-12-24 ENCOUNTER — HOSPITAL ENCOUNTER (OUTPATIENT)
Dept: RADIOLOGY | Facility: MEDICAL CENTER | Age: 35
End: 2020-12-24
Attending: OBSTETRICS & GYNECOLOGY
Payer: COMMERCIAL

## 2020-12-24 DIAGNOSIS — N83.209 CYST OF OVARY, UNSPECIFIED LATERALITY: ICD-10-CM

## 2020-12-24 PROCEDURE — 76830 TRANSVAGINAL US NON-OB: CPT

## 2020-12-29 ENCOUNTER — TELEPHONE (OUTPATIENT)
Dept: OBGYN | Facility: CLINIC | Age: 35
End: 2020-12-29

## 2020-12-29 NOTE — TELEPHONE ENCOUNTER
----- Message from Aniket Monahan M.D. sent at 12/28/2020  3:25 AM PST -----  U/S shows normal exam , except for known Septate uterus , and IUD present . No developing or complex cysts or free fluid in pelvis   Report result normal to patient and follow up at normal interval       12/29/2020 1532 Left message for pt to call back regarding US results.   1548 pt called back and left message to call her back.   1557 called pt back and notified as above. Pt verbalized understanding.

## 2021-01-08 ENCOUNTER — IMMUNIZATION (OUTPATIENT)
Dept: FAMILY PLANNING/WOMEN'S HEALTH CLINIC | Facility: IMMUNIZATION CENTER | Age: 36
End: 2021-01-08
Attending: FAMILY MEDICINE
Payer: COMMERCIAL

## 2021-01-08 DIAGNOSIS — Z23 ENCOUNTER FOR VACCINATION: Primary | ICD-10-CM

## 2021-01-08 PROCEDURE — 91300 PFIZER SARS-COV-2 VACCINE: CPT

## 2021-01-08 PROCEDURE — 0002A PFIZER SARS-COV-2 VACCINE: CPT

## 2021-01-15 ENCOUNTER — TELEPHONE (OUTPATIENT)
Dept: DERMATOLOGY | Facility: IMAGING CENTER | Age: 36
End: 2021-01-15

## 2021-01-21 RX ORDER — BUPROPION HYDROCHLORIDE 150 MG/1
TABLET, EXTENDED RELEASE ORAL
Qty: 180 TAB | Refills: 3 | Status: SHIPPED | OUTPATIENT
Start: 2021-01-21 | End: 2022-01-10

## 2021-02-08 ENCOUNTER — PATIENT MESSAGE (OUTPATIENT)
Dept: MEDICAL GROUP | Facility: MEDICAL CENTER | Age: 36
End: 2021-02-08

## 2021-03-05 ENCOUNTER — APPOINTMENT (OUTPATIENT)
Dept: MEDICAL GROUP | Facility: MEDICAL CENTER | Age: 36
End: 2021-03-05
Payer: COMMERCIAL
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
## 2021-05-01 DIAGNOSIS — S81.012A LACERATION OF LEFT KNEE, INITIAL ENCOUNTER: ICD-10-CM

## 2021-05-01 PROCEDURE — 90715 TDAP VACCINE 7 YRS/> IM: CPT | Performed by: NURSE PRACTITIONER

## 2021-05-01 PROCEDURE — 90471 IMMUNIZATION ADMIN: CPT | Performed by: NURSE PRACTITIONER

## 2021-05-01 RX ORDER — CEPHALEXIN 500 MG/1
500 CAPSULE ORAL 4 TIMES DAILY
Qty: 20 CAPSULE | Refills: 0 | Status: SHIPPED | OUTPATIENT
Start: 2021-05-01 | End: 2021-05-06

## 2021-05-11 ENCOUNTER — APPOINTMENT (OUTPATIENT)
Dept: DERMATOLOGY | Facility: IMAGING CENTER | Age: 36
End: 2021-05-11

## 2021-07-16 ENCOUNTER — APPOINTMENT (OUTPATIENT)
Dept: DERMATOLOGY | Facility: IMAGING CENTER | Age: 36
End: 2021-07-16

## 2021-07-21 ENCOUNTER — TELEPHONE (OUTPATIENT)
Dept: MEDICAL GROUP | Facility: MEDICAL CENTER | Age: 36
End: 2021-07-21

## 2021-07-21 NOTE — TELEPHONE ENCOUNTER
Phone Number Called: [phone]    Call outcome: Spoke to patient regarding message below.    Message: Called to inform patient of message below. This RN gave the patient Jacinto's number to contact him regarding additional genetic counseling.                           ----- Message from Cale Yu Ass't sent at 7/20/2021  3:09 PM PDT -----  Regarding: [employer] Results  Marquise Romero,    This patient is a [employer] [place] [facility] participant who was confirmed as being BRCA2 positive through our study. It appears the patient is already aware of her gene status outside of the study but could you please reach out to her to see if she would like additional genetic counseling through the study. If yes, I can arrange for her to speak to a licensed genetic counselor at [facility] as a study participant. Her test results are uploaded to  and she should also be able to view her results report through her [employer] account if she hasn't already.    Please let me know if she requires any additional follow up or if you have any questions or concerns.    Best Regards,  Jacinto Ramsay  Lead Clinical Research Coordinator

## 2021-08-25 ENCOUNTER — GYNECOLOGY VISIT (OUTPATIENT)
Dept: OBGYN | Facility: CLINIC | Age: 36
End: 2021-08-25
Payer: COMMERCIAL

## 2021-08-25 VITALS
DIASTOLIC BLOOD PRESSURE: 79 MMHG | BODY MASS INDEX: 22.98 KG/M2 | WEIGHT: 143 LBS | SYSTOLIC BLOOD PRESSURE: 123 MMHG | HEIGHT: 66 IN

## 2021-08-25 DIAGNOSIS — Z97.5 IUD (INTRAUTERINE DEVICE) IN PLACE: ICD-10-CM

## 2021-08-25 DIAGNOSIS — Z30.432 ENCOUNTER FOR IUD REMOVAL: ICD-10-CM

## 2021-08-25 DIAGNOSIS — Z15.01 BRCA2 GENE MUTATION POSITIVE: ICD-10-CM

## 2021-08-25 DIAGNOSIS — Z15.09 BRCA2 GENE MUTATION POSITIVE: ICD-10-CM

## 2021-08-25 PROCEDURE — 58301 REMOVE INTRAUTERINE DEVICE: CPT | Performed by: OBSTETRICS & GYNECOLOGY

## 2021-08-25 RX ORDER — IBUPROFEN 600 MG/1
600 TABLET ORAL EVERY 6 HOURS PRN
COMMUNITY

## 2021-08-25 RX ORDER — ACETAMINOPHEN 500 MG
500-1000 TABLET ORAL EVERY 6 HOURS PRN
COMMUNITY

## 2021-08-25 RX ORDER — MELATONIN 5 MG
TABLET,CHEWABLE ORAL
COMMUNITY

## 2021-08-25 ASSESSMENT — FIBROSIS 4 INDEX: FIB4 SCORE: 0.81

## 2021-08-25 NOTE — PROGRESS NOTES
"Subjective     Caitlin Leiva is a 35 y.o. female who presents with Gynecologic Exam (Discuss IUD removal)            HPI patient is a 35-year-old  0 who presents today to discuss Mirena IUD removal.  She has had IUD since 2016.  She is not having any problems with her IUD but is planning to do egg retrieval due to recent diagnosis of BRCA2.  She has appointment with Dr. Melchor at reproductive centers [facility].  She states she is considering bilateral salpingo-oophorectomy after her egg retrieval procedures.    ROS all organ system reviewed and are currently negative           Objective     /79 (BP Location: Right arm, Patient Position: Sitting, BP Cuff Size: Adult)   Ht 1.676 m (5' 6\")   Wt 64.9 kg (143 lb)   LMP 2021 (Exact Date)   BMI 23.08 kg/m²      Physical Exam  Vitals and nursing note reviewed. Exam conducted with a chaperone present.   Constitutional:       Appearance: Normal appearance.   Skin:     General: Skin is warm and dry.   Neurological:      General: No focal deficit present.      Mental Status: She is alert and oriented to person, place, and time.      Gait: Gait normal.   Psychiatric:         Mood and Affect: Mood normal.         Behavior: Behavior normal.         Thought Content: Thought content normal.         Judgment: Judgment normal.          Discussion:  We discussed IUD removal procedure and informed consents were obtained.  Patient will follow up with Dr. Melchor for procedure oocyte retrieval and storage.  She will follow-up with GYN afterwards to discuss laparoscopic BSO.  All questions and concerns were addressed.                   Assessment & Plan        1. Encounter for IUD removal  35-year-old  0 here to discuss IUD removal.  Patient has a Mirena IUD and has been doing well with IUD without any IUD concerns.  She is planning oocyte retrieval due to recent history of BRCA 2 diagnosis  - Consent for all Surgical, Special Diagnostic or Therapeutic " Procedures     2.  IUD removed successfully.  Patient tolerated procedure well  Patient to follow-up as needed.  Precautions and plan of care were discussed

## 2021-08-25 NOTE — PROCEDURES
S: Patient presents for IUD removal due to  Planned oocyte retrieval procedure    O: VSS, afebrile  Gen - NAD, comfortable  PELVIC EXAM -   Normal external female genitalia  BUS within normal limits, no lesions  Spec: cervix has no lesions, normal physiologic white discharge, IUD strings visualized from cervix  Vaginal wall: pink and moist, normal rugae, no lesions    Procedure - IUD removal:  Pt counseled on IUD removal. Risk for cramping, bleeding discussed.  Consent form signed.  Speculum placed in the vagina and cervix visualized. IUD strings seen. IUD strings grasped with ring forceps and removed intact on the third attempt.  IUD appears to have been embedded.  Hemostasis noted. Pt tolerated procedure well.    A: Mirena IUD removed intact    P: Patient to follow-up after her infertility evaluation and treatment      Precautions and plan of care reviewed

## 2021-10-01 ENCOUNTER — APPOINTMENT (OUTPATIENT)
Dept: DERMATOLOGY | Facility: IMAGING CENTER | Age: 36
End: 2021-10-01

## 2021-10-11 ENCOUNTER — HOSPITAL ENCOUNTER (OUTPATIENT)
Facility: MEDICAL CENTER | Age: 36
End: 2021-10-11
Attending: OBSTETRICS & GYNECOLOGY
Payer: COMMERCIAL

## 2021-10-11 PROCEDURE — 83520 IMMUNOASSAY QUANT NOS NONAB: CPT

## 2021-10-14 LAB — MIS SERPL-MCNC: 5.43 NG/ML (ref 0.18–11.71)

## 2021-11-01 ENCOUNTER — NON-PROVIDER VISIT (OUTPATIENT)
Dept: URGENT CARE | Facility: PHYSICIAN GROUP | Age: 36
End: 2021-11-01
Payer: COMMERCIAL

## 2021-11-01 DIAGNOSIS — Z23 NEED FOR VACCINATION: ICD-10-CM

## 2021-11-01 PROCEDURE — 90471 IMMUNIZATION ADMIN: CPT | Performed by: NURSE PRACTITIONER

## 2021-11-01 PROCEDURE — 90686 IIV4 VACC NO PRSV 0.5 ML IM: CPT | Performed by: NURSE PRACTITIONER

## 2021-11-01 NOTE — PROGRESS NOTES
"Caitlin Leiva is a 35 y.o. female here for a non-provider visit for:   FLU    Reason for immunization: Annual Flu Vaccine  Immunization records indicate need for vaccine: Yes, confirmed with Epic  Minimum interval has been met for this vaccine: Yes  ABN completed: No    VIS Dated  8/6/21 was given to patient: Yes  All IAC Questionnaire questions were answered \"No.\"    Patient tolerated injection and no adverse effects were observed or reported: Yes    Pt scheduled for next dose in series: No    "

## 2021-12-13 ENCOUNTER — PHARMACY VISIT (OUTPATIENT)
Dept: PHARMACY | Facility: MEDICAL CENTER | Age: 36
End: 2021-12-13
Payer: COMMERCIAL

## 2021-12-13 PROCEDURE — RXMED WILLOW AMBULATORY MEDICATION CHARGE: Performed by: INTERNAL MEDICINE

## 2021-12-22 ENCOUNTER — APPOINTMENT (OUTPATIENT)
Dept: URGENT CARE | Facility: CLINIC | Age: 36
End: 2021-12-22
Payer: COMMERCIAL

## 2022-01-10 ENCOUNTER — OFFICE VISIT (OUTPATIENT)
Dept: MEDICAL GROUP | Facility: MEDICAL CENTER | Age: 37
End: 2022-01-10
Payer: COMMERCIAL

## 2022-01-10 VITALS
RESPIRATION RATE: 16 BRPM | DIASTOLIC BLOOD PRESSURE: 68 MMHG | HEART RATE: 68 BPM | TEMPERATURE: 97.2 F | SYSTOLIC BLOOD PRESSURE: 118 MMHG | HEIGHT: 66 IN | WEIGHT: 147 LBS | OXYGEN SATURATION: 97 % | BODY MASS INDEX: 23.63 KG/M2

## 2022-01-10 DIAGNOSIS — F32.A DEPRESSION, UNSPECIFIED DEPRESSION TYPE: ICD-10-CM

## 2022-01-10 DIAGNOSIS — L40.9 SCALP PSORIASIS: ICD-10-CM

## 2022-01-10 PROBLEM — Z00.00 ROUTINE GENERAL MEDICAL EXAMINATION AT A HEALTH CARE FACILITY: Status: RESOLVED | Noted: 2020-03-11 | Resolved: 2022-01-10

## 2022-01-10 PROCEDURE — 99213 OFFICE O/P EST LOW 20 MIN: CPT | Performed by: STUDENT IN AN ORGANIZED HEALTH CARE EDUCATION/TRAINING PROGRAM

## 2022-01-10 RX ORDER — BUPROPION HYDROCHLORIDE 150 MG/1
150 TABLET ORAL EVERY MORNING
Qty: 90 TABLET | Refills: 3 | Status: SHIPPED | OUTPATIENT
Start: 2022-01-10 | End: 2023-01-30

## 2022-01-10 RX ORDER — HYDROXYZINE 50 MG/1
50 TABLET, FILM COATED ORAL 3 TIMES DAILY PRN
Qty: 90 TABLET | Refills: 3 | Status: SHIPPED | OUTPATIENT
Start: 2022-01-10 | End: 2022-11-11

## 2022-01-10 RX ORDER — HYDROXYZINE 50 MG/1
50 TABLET, FILM COATED ORAL 3 TIMES DAILY PRN
COMMUNITY
End: 2022-01-10 | Stop reason: SDUPTHER

## 2022-01-10 RX ORDER — CLOBETASOL PROPIONATE 0.05 G/100ML
1 SHAMPOO TOPICAL
Qty: 118 ML | Refills: 0 | Status: SHIPPED | OUTPATIENT
Start: 2022-01-10 | End: 2022-09-09

## 2022-01-10 RX ORDER — CLOBETASOL PROPIONATE 0.5 MG/G
1 CREAM TOPICAL 2 TIMES DAILY
Qty: 30 G | Refills: 0 | Status: SHIPPED | OUTPATIENT
Start: 2022-01-10 | End: 2022-09-09

## 2022-01-10 ASSESSMENT — FIBROSIS 4 INDEX: FIB4 SCORE: 0.84

## 2022-01-10 ASSESSMENT — PATIENT HEALTH QUESTIONNAIRE - PHQ9: CLINICAL INTERPRETATION OF PHQ2 SCORE: 0

## 2022-01-10 NOTE — PROGRESS NOTES
"Subjective:     CC: Establish care, depression, scalp irritation    HPI:   Saritha presents today to establish care.    Scalp irritation  Patient presents today for scalp irritation x2 months.  Patient states that she has noticed her hair is increasingly flaky and itchy on her scalp.  Patient has tried Neosporin which has helped with itching but has not resolved the problem.  Patient notes that she does have 2 enlarged lymph nodes on the occipital right side.  Patient notes no change in soaps or hair products.    Depression  Patient continues on bupropion 150 mg once daily.  We will switch from sustained-release to extended release since patient has switched to only taking it once daily.  Patient states that she continues to manage well on this dose.  Patient using hydroxyzine as needed for anxiety but mostly for sleep.  Patient notes that previously used melatonin but caused vivid dreams.    ROS:  Gen: no fevers/chills  Pulm: no sob, no cough  CV: no chest pain, no palpitations  GI: no nausea/vomiting, no diarrhea  Neuro: no headaches      Objective:     Exam:  /68 (BP Location: Left arm, Patient Position: Sitting, BP Cuff Size: Adult)   Pulse 68   Temp 36.2 °C (97.2 °F) (Temporal)   Resp 16   Ht 1.676 m (5' 6\")   Wt 66.7 kg (147 lb)   SpO2 97%   BMI 23.73 kg/m²  Body mass index is 23.73 kg/m².    Gen: Alert and oriented, No apparent distress.  Neck: Neck is supple without lymphadenopathy.  Lungs: Normal effort, CTA bilaterally, no wheezes, rhonchi, or rales  CV: Regular rate and rhythm. No murmurs, rubs, or gallops.  Ext: No clubbing, cyanosis, edema.  Scalp: Erythematous with dry scaly plaque.  Flakes scattered throughout hair.  2 small enlarged lymph nodes in occipital region right side.      Assessment & Plan:     36 y.o. female with the following -     1. Scalp psoriasis  Acute, stable.  Patient notes scalp irritation x2 months.  On exam patient's scalp is erythematous, dry and flaky.  We will " treat for scalp psoriasis and patient encouraged to monitor lymph nodes and follow-up.  - Clobetasol Propionate 0.05 % Shampoo; Apply 1 Application topically 1 time a day as needed. Apply thin film to dry scalp once daily; leave in place for 15 minutes, then add water, lather, and rinse thoroughly. Limit treatment to 4 consecutive weeks.  Dispense: 118 mL; Refill: 0  - clobetasol (TEMOVATE) 0.05 % Cream; Apply 1 Application topically 2 times a day.  Dispense: 30 g; Refill: 0    2. Depression, unspecified depression type  Chronic, stable.  Patient's PHQ-9 score is 0 today.  We will switch bupropion from 12-hour sustained release to extended release.  Patient will continues to use hydroxyzine as needed for sleep and anxiety.  - buPROPion (WELLBUTRIN XL) 150 MG XL tablet; Take 1 Tablet by mouth every morning.  Dispense: 90 Tablet; Refill: 3  - hydrOXYzine HCl (ATARAX) 50 MG Tab; Take 1 Tablet by mouth 3 times a day as needed for Anxiety.  Dispense: 90 Tablet; Refill: 3        Return if symptoms worsen or fail to improve.    Please note that this dictation was created using voice recognition software. I have made every reasonable attempt to correct obvious errors, but I expect that there are errors of grammar and possibly content that I did not discover before finalizing the note.

## 2022-01-13 ENCOUNTER — HOSPITAL ENCOUNTER (OUTPATIENT)
Facility: MEDICAL CENTER | Age: 37
End: 2022-01-13
Attending: INTERNAL MEDICINE
Payer: COMMERCIAL

## 2022-01-13 PROCEDURE — U0005 INFEC AGEN DETEC AMPLI PROBE: HCPCS

## 2022-01-13 PROCEDURE — U0003 INFECTIOUS AGENT DETECTION BY NUCLEIC ACID (DNA OR RNA); SEVERE ACUTE RESPIRATORY SYNDROME CORONAVIRUS 2 (SARS-COV-2) (CORONAVIRUS DISEASE [COVID-19]), AMPLIFIED PROBE TECHNIQUE, MAKING USE OF HIGH THROUGHPUT TECHNOLOGIES AS DESCRIBED BY CMS-2020-01-R: HCPCS

## 2022-01-14 LAB
COVID ORDER STATUS COVID19: NORMAL
SARS-COV-2 RNA RESP QL NAA+PROBE: DETECTED
SPECIMEN SOURCE: ABNORMAL

## 2022-01-17 ENCOUNTER — NON-PROVIDER VISIT (OUTPATIENT)
Dept: URGENT CARE | Facility: CLINIC | Age: 37
End: 2022-01-17
Payer: COMMERCIAL

## 2022-01-17 DIAGNOSIS — Z11.1 ENCOUNTER FOR PPD TEST: ICD-10-CM

## 2022-01-17 PROCEDURE — 86580 TB INTRADERMAL TEST: CPT | Performed by: FAMILY MEDICINE

## 2022-01-20 ENCOUNTER — NON-PROVIDER VISIT (OUTPATIENT)
Dept: URGENT CARE | Facility: PHYSICIAN GROUP | Age: 37
End: 2022-01-20

## 2022-01-20 LAB — TB WHEAL 3D P 5 TU DIAM: 0 MM

## 2022-05-18 ENCOUNTER — TELEMEDICINE (OUTPATIENT)
Dept: MEDICAL GROUP | Facility: MEDICAL CENTER | Age: 37
End: 2022-05-18
Payer: COMMERCIAL

## 2022-05-18 VITALS — BODY MASS INDEX: 23.66 KG/M2 | HEIGHT: 65 IN | WEIGHT: 142 LBS

## 2022-05-18 DIAGNOSIS — Z30.09 BIRTH CONTROL COUNSELING: ICD-10-CM

## 2022-05-18 PROCEDURE — 99213 OFFICE O/P EST LOW 20 MIN: CPT | Mod: 95 | Performed by: STUDENT IN AN ORGANIZED HEALTH CARE EDUCATION/TRAINING PROGRAM

## 2022-05-18 RX ORDER — ACETAMINOPHEN AND CODEINE PHOSPHATE 120; 12 MG/5ML; MG/5ML
1 SOLUTION ORAL DAILY
Qty: 84 TABLET | Refills: 3 | Status: SHIPPED | OUTPATIENT
Start: 2022-05-18 | End: 2022-11-01 | Stop reason: SDUPTHER

## 2022-05-18 NOTE — PROGRESS NOTES
"Virtual Visit: Established Patient   This visit was conducted via Zoom using secure and encrypted videoconferencing technology.   The patient was in their home in the [facility].    The patient's identity was confirmed and verbal consent was obtained for this virtual visit.     Subjective:   CC:   Chief Complaint   Patient presents with   • Follow-Up     Medication        Saritha Leiva is a 36 y.o. female presenting for evaluation and management of:    Birth control counseling  Patient had IUD removed last year.  Patient was started on Micronor by health department and has continued on this for the last 6 months.  Patient does not use tobacco products, BMI within normal limits, no history of migraines.  Patient is tolerating medication well.  Patient has no new concerns and would like refills on this medication.    Scalp irritation  Patient notes that this is improved.      ROS   See HPI   Objective:   Ht 1.651 m (5' 5\")   Wt 64.4 kg (142 lb)   BMI 23.63 kg/m²     Physical Exam:  Constitutional: Alert, no distress, well-groomed.  Skin: No rashes in visible areas.  Eye: Round. Conjunctiva clear, lids normal. No icterus.   ENMT: Lips pink without lesions, good dentition, moist mucous membranes. Phonation normal.  Neck: No masses, no thyromegaly. Moves freely without pain.  Respiratory: Unlabored respiratory effort, no cough or audible wheeze  Psych: Alert and oriented x3, normal affect and mood.     Assessment and Plan:   The following treatment plan was discussed:     1. Birth control counseling  Chronic, stable.  Patient has no new concerns today patient would like to continue on this medication.  Counseled on risks and benefits of birth control.  - norethindrone (MICRONOR) 0.35 MG tablet; Take 1 Tablet by mouth every day.  Dispense: 84 Tablet; Refill: 3      Follow-up: Return in about 1 year (around 5/18/2023), or if symptoms worsen or fail to improve.         "

## 2022-09-09 ENCOUNTER — OFFICE VISIT (OUTPATIENT)
Dept: MEDICAL GROUP | Facility: MEDICAL CENTER | Age: 37
End: 2022-09-09
Payer: COMMERCIAL

## 2022-09-09 VITALS
OXYGEN SATURATION: 97 % | HEIGHT: 66 IN | WEIGHT: 143 LBS | RESPIRATION RATE: 16 BRPM | TEMPERATURE: 97.2 F | SYSTOLIC BLOOD PRESSURE: 122 MMHG | HEART RATE: 67 BPM | DIASTOLIC BLOOD PRESSURE: 72 MMHG | BODY MASS INDEX: 22.98 KG/M2

## 2022-09-09 DIAGNOSIS — Z00.00 WELLNESS EXAMINATION: ICD-10-CM

## 2022-09-09 DIAGNOSIS — Z15.09 BRCA POSITIVE: ICD-10-CM

## 2022-09-09 DIAGNOSIS — Z15.01 BRCA POSITIVE: ICD-10-CM

## 2022-09-09 PROCEDURE — 99214 OFFICE O/P EST MOD 30 MIN: CPT | Performed by: STUDENT IN AN ORGANIZED HEALTH CARE EDUCATION/TRAINING PROGRAM

## 2022-09-09 NOTE — PROGRESS NOTES
"Subjective:     Chief Complaint   Patient presents with    Requesting Labs     General labwork    Referral Needed     To dr lubin    Annual Exam         HPI:   Saritha presents today with     BRCA gene positive  Patient with a significant family history of breast and ovarian cancer.  Patient has had bilateral mastectomy for prevention of breast cancer.  Patient fearful of ovarian cancer and requesting referral to Dr. Lubin's office for routine evaluation and preventative care.  We will get  and ultrasound for further evaluation.     OCP  Patient continues on Micronor.  No new concerns.    Scalp irritation  Scalp irritation from previous visit has resolved.    Depression  Patient continues on bupropion 150 mg once daily.  Patient with hydroxyzine to use as needed.    ROS:  Gen: no fevers/chills  Pulm: no sob, no cough  CV: no chest pain, no palpitations  GI: no nausea/vomiting, no diarrhea      Objective:     Exam:  /72 (BP Location: Left arm, Patient Position: Sitting, BP Cuff Size: Adult)   Pulse 67   Temp 36.2 °C (97.2 °F) (Temporal)   Resp 16   Ht 1.664 m (5' 5.5\")   Wt 64.9 kg (143 lb)   SpO2 97%   BMI 23.43 kg/m²  Body mass index is 23.43 kg/m².    Gen: Alert and oriented, No apparent distress.  Neck: Neck is supple without lymphadenopathy.  Lungs: Normal effort, CTA bilaterally, no wheezes, rhonchi, or rales  CV: Regular rate and rhythm. No murmurs, rubs, or gallops.  Ext: No clubbing, cyanosis, edema.      Assessment & Plan:     36 y.o. female with the following -     1. Wellness examination  Will get labs to further evaluate general health.  - Lipid Profile; Future  - Comp Metabolic Panel; Future  - CBC WITHOUT DIFFERENTIAL; Future  - TSH; Future    2. BRCA positive  - Referral to Gynecologic Oncology  - US-PELVIC COMPLETE (TRANSABDOMINAL/TRANSVAGINAL) (COMBO); Future  - CANCER ANTIGEN 125; Future       Return if symptoms worsen or fail to improve.    Please note that this dictation was created " using voice recognition software. I have made every reasonable attempt to correct obvious errors, but I expect that there are errors of grammar and possibly content that I did not discover before finalizing the note.

## 2022-10-12 ENCOUNTER — PHARMACY VISIT (OUTPATIENT)
Dept: PHARMACY | Facility: MEDICAL CENTER | Age: 37
End: 2022-10-12
Payer: COMMERCIAL

## 2022-10-12 ENCOUNTER — HOSPITAL ENCOUNTER (OUTPATIENT)
Dept: RADIOLOGY | Facility: MEDICAL CENTER | Age: 37
End: 2022-10-12
Attending: STUDENT IN AN ORGANIZED HEALTH CARE EDUCATION/TRAINING PROGRAM
Payer: COMMERCIAL

## 2022-10-12 DIAGNOSIS — Z15.01 BRCA POSITIVE: ICD-10-CM

## 2022-10-12 DIAGNOSIS — Z15.09 BRCA POSITIVE: ICD-10-CM

## 2022-10-12 PROCEDURE — 76830 TRANSVAGINAL US NON-OB: CPT

## 2022-10-12 PROCEDURE — RXMED WILLOW AMBULATORY MEDICATION CHARGE: Performed by: INTERNAL MEDICINE

## 2022-10-12 RX ORDER — INFLUENZA A VIRUS A/BRISBANE/02/2018 IVR-190 (H1N1) ANTIGEN (FORMALDEHYDE INACTIVATED), INFLUENZA A VIRUS A/KANSAS/14/2017 X-327 (H3N2) ANTIGEN (FORMALDEHYDE INACTIVATED), INFLUENZA B VIRUS B/PHUKET/3073/2013 ANTIGEN (FORMALDEHYDE INACTIVATED), AND INFLUENZA B VIRUS B/MARYLAND/15/2016 BX-69A ANTIGEN (FORMALDEHYDE INACTIVATED) 15; 15; 15; 15 UG/.5ML; UG/.5ML; UG/.5ML; UG/.5ML
INJECTION, SUSPENSION INTRAMUSCULAR
Qty: 0.5 ML | Refills: 0 | OUTPATIENT
Start: 2022-10-12

## 2022-11-01 ENCOUNTER — HOSPITAL ENCOUNTER (OUTPATIENT)
Dept: LAB | Facility: MEDICAL CENTER | Age: 37
End: 2022-11-01
Attending: STUDENT IN AN ORGANIZED HEALTH CARE EDUCATION/TRAINING PROGRAM
Payer: COMMERCIAL

## 2022-11-01 DIAGNOSIS — Z30.09 BIRTH CONTROL COUNSELING: ICD-10-CM

## 2022-11-01 DIAGNOSIS — Z00.00 WELLNESS EXAMINATION: ICD-10-CM

## 2022-11-01 DIAGNOSIS — Z15.01 BRCA POSITIVE: ICD-10-CM

## 2022-11-01 DIAGNOSIS — Z15.09 BRCA POSITIVE: ICD-10-CM

## 2022-11-01 LAB
ALBUMIN SERPL BCP-MCNC: 4.7 G/DL (ref 3.2–4.9)
ALBUMIN/GLOB SERPL: 2.1 G/DL
ALP SERPL-CCNC: 53 U/L (ref 30–99)
ALT SERPL-CCNC: 16 U/L (ref 2–50)
ANION GAP SERPL CALC-SCNC: 12 MMOL/L (ref 7–16)
AST SERPL-CCNC: 22 U/L (ref 12–45)
BILIRUB SERPL-MCNC: 0.7 MG/DL (ref 0.1–1.5)
BUN SERPL-MCNC: 18 MG/DL (ref 8–22)
CALCIUM SERPL-MCNC: 9.7 MG/DL (ref 8.5–10.5)
CANCER AG125 SERPL-ACNC: 12.3 U/ML (ref 0–35)
CHLORIDE SERPL-SCNC: 107 MMOL/L (ref 96–112)
CHOLEST SERPL-MCNC: 183 MG/DL (ref 100–199)
CO2 SERPL-SCNC: 23 MMOL/L (ref 20–33)
CREAT SERPL-MCNC: 1.05 MG/DL (ref 0.5–1.4)
ERYTHROCYTE [DISTWIDTH] IN BLOOD BY AUTOMATED COUNT: 44.5 FL (ref 35.9–50)
FASTING STATUS PATIENT QL REPORTED: NORMAL
GFR SERPLBLD CREATININE-BSD FMLA CKD-EPI: 71 ML/MIN/1.73 M 2
GLOBULIN SER CALC-MCNC: 2.2 G/DL (ref 1.9–3.5)
GLUCOSE SERPL-MCNC: 72 MG/DL (ref 65–99)
HCT VFR BLD AUTO: 40.8 % (ref 37–47)
HDLC SERPL-MCNC: 95 MG/DL
HGB BLD-MCNC: 14 G/DL (ref 12–16)
LDLC SERPL CALC-MCNC: 72 MG/DL
MCH RBC QN AUTO: 32.3 PG (ref 27–33)
MCHC RBC AUTO-ENTMCNC: 34.3 G/DL (ref 33.6–35)
MCV RBC AUTO: 94 FL (ref 81.4–97.8)
PLATELET # BLD AUTO: 203 K/UL (ref 164–446)
PMV BLD AUTO: 10.3 FL (ref 9–12.9)
POTASSIUM SERPL-SCNC: 4.4 MMOL/L (ref 3.6–5.5)
PROT SERPL-MCNC: 6.9 G/DL (ref 6–8.2)
RBC # BLD AUTO: 4.34 M/UL (ref 4.2–5.4)
SODIUM SERPL-SCNC: 142 MMOL/L (ref 135–145)
TRIGL SERPL-MCNC: 79 MG/DL (ref 0–149)
TSH SERPL DL<=0.005 MIU/L-ACNC: 3.84 UIU/ML (ref 0.38–5.33)
WBC # BLD AUTO: 5.1 K/UL (ref 4.8–10.8)

## 2022-11-01 PROCEDURE — 85027 COMPLETE CBC AUTOMATED: CPT

## 2022-11-01 PROCEDURE — 80061 LIPID PANEL: CPT

## 2022-11-01 PROCEDURE — 84443 ASSAY THYROID STIM HORMONE: CPT

## 2022-11-01 PROCEDURE — 36415 COLL VENOUS BLD VENIPUNCTURE: CPT

## 2022-11-01 PROCEDURE — 86304 IMMUNOASSAY TUMOR CA 125: CPT

## 2022-11-01 PROCEDURE — 80053 COMPREHEN METABOLIC PANEL: CPT

## 2022-11-03 RX ORDER — ACETAMINOPHEN AND CODEINE PHOSPHATE 120; 12 MG/5ML; MG/5ML
1 SOLUTION ORAL DAILY
Qty: 84 TABLET | Refills: 0 | Status: SHIPPED | OUTPATIENT
Start: 2022-11-03 | End: 2023-02-17 | Stop reason: SDUPTHER

## 2023-02-18 DIAGNOSIS — Z30.09 BIRTH CONTROL COUNSELING: ICD-10-CM

## 2023-04-11 ENCOUNTER — APPOINTMENT (OUTPATIENT)
Dept: RADIOLOGY | Facility: MEDICAL CENTER | Age: 38
End: 2023-04-11
Attending: OBSTETRICS & GYNECOLOGY
Payer: COMMERCIAL

## 2023-04-26 ENCOUNTER — HOSPITAL ENCOUNTER (OUTPATIENT)
Dept: RADIOLOGY | Facility: MEDICAL CENTER | Age: 38
End: 2023-04-26
Attending: OBSTETRICS & GYNECOLOGY
Payer: COMMERCIAL

## 2023-04-26 DIAGNOSIS — R10.2 PELVIC AND PERINEAL PAIN: ICD-10-CM

## 2023-04-26 DIAGNOSIS — Z15.01 GENETIC SUSCEPTIBILITY TO MALIGNANT NEOPLASM OF BREAST: ICD-10-CM

## 2023-04-26 PROCEDURE — 76830 TRANSVAGINAL US NON-OB: CPT

## 2023-05-05 ENCOUNTER — HOSPITAL ENCOUNTER (OUTPATIENT)
Dept: LAB | Facility: MEDICAL CENTER | Age: 38
End: 2023-05-05
Attending: OBSTETRICS & GYNECOLOGY
Payer: COMMERCIAL

## 2023-05-05 LAB — CANCER AG125 SERPL-ACNC: 10 U/ML (ref 0–35)

## 2023-05-05 PROCEDURE — 86304 IMMUNOASSAY TUMOR CA 125: CPT

## 2023-05-05 PROCEDURE — 36415 COLL VENOUS BLD VENIPUNCTURE: CPT

## 2023-05-19 RX ORDER — NORETHINDRONE 0.35 MG/1
TABLET ORAL
Qty: 28 TABLET | OUTPATIENT
Start: 2023-05-19

## 2023-08-24 ENCOUNTER — HOSPITAL ENCOUNTER (OUTPATIENT)
Dept: RADIOLOGY | Facility: MEDICAL CENTER | Age: 38
End: 2023-08-24
Attending: OBSTETRICS & GYNECOLOGY
Payer: COMMERCIAL

## 2023-08-24 DIAGNOSIS — R10.2 PELVIC AND PERINEAL PAIN: ICD-10-CM

## 2023-08-24 DIAGNOSIS — Z80.3 FAMILY HISTORY OF MALIGNANT NEOPLASM OF BREAST: ICD-10-CM

## 2023-08-24 DIAGNOSIS — Z15.01 GENETIC SUSCEPTIBILITY TO MALIGNANT NEOPLASM OF BREAST: ICD-10-CM

## 2023-08-24 PROCEDURE — 76830 TRANSVAGINAL US NON-OB: CPT

## 2023-10-24 ENCOUNTER — HOSPITAL ENCOUNTER (OUTPATIENT)
Dept: RADIOLOGY | Facility: MEDICAL CENTER | Age: 38
End: 2023-10-24
Attending: OBSTETRICS & GYNECOLOGY
Payer: COMMERCIAL

## 2023-10-24 DIAGNOSIS — R10.2 PELVIC AND PERINEAL PAIN: ICD-10-CM

## 2023-10-24 DIAGNOSIS — Z80.3 FAMILY HISTORY OF MALIGNANT NEOPLASM OF BREAST: ICD-10-CM

## 2023-10-24 DIAGNOSIS — Z15.01 GENETIC SUSCEPTIBILITY TO MALIGNANT NEOPLASM OF BREAST: ICD-10-CM

## 2023-10-24 PROCEDURE — 76830 TRANSVAGINAL US NON-OB: CPT

## 2023-11-03 ENCOUNTER — PHARMACY VISIT (OUTPATIENT)
Dept: PHARMACY | Facility: MEDICAL CENTER | Age: 38
End: 2023-11-03
Payer: COMMERCIAL

## 2023-11-03 ENCOUNTER — HOSPITAL ENCOUNTER (OUTPATIENT)
Dept: LAB | Facility: MEDICAL CENTER | Age: 38
End: 2023-11-03
Attending: OBSTETRICS & GYNECOLOGY
Payer: COMMERCIAL

## 2023-11-03 DIAGNOSIS — Z12.73 SCREENING FOR OVARIAN CANCER: ICD-10-CM

## 2023-11-03 DIAGNOSIS — Z12.73 SCREENING FOR OVARIAN CANCER: Primary | ICD-10-CM

## 2023-11-03 LAB — CANCER AG125 SERPL-ACNC: 13.1 U/ML (ref 0–35)

## 2023-11-03 PROCEDURE — 86304 IMMUNOASSAY TUMOR CA 125: CPT

## 2023-11-03 PROCEDURE — RXMED WILLOW AMBULATORY MEDICATION CHARGE: Performed by: INTERNAL MEDICINE

## 2023-11-03 PROCEDURE — 36415 COLL VENOUS BLD VENIPUNCTURE: CPT

## 2023-11-03 RX ORDER — COVID-19 VACCINE, MRNA 0.23 MG/2.25ML
INJECTION, SUSPENSION INTRAMUSCULAR
Qty: 0.3 ML | Refills: 0 | OUTPATIENT
Start: 2023-11-03

## 2023-12-29 ENCOUNTER — DOCUMENTATION (OUTPATIENT)
Dept: HEALTH INFORMATION MANAGEMENT | Facility: OTHER | Age: 38
End: 2023-12-29
Payer: COMMERCIAL

## 2024-01-21 SDOH — ECONOMIC STABILITY: TRANSPORTATION INSECURITY
IN THE PAST 12 MONTHS, HAS THE LACK OF TRANSPORTATION KEPT YOU FROM MEDICAL APPOINTMENTS OR FROM GETTING MEDICATIONS?: NO

## 2024-01-21 SDOH — ECONOMIC STABILITY: FOOD INSECURITY: WITHIN THE PAST 12 MONTHS, THE FOOD YOU BOUGHT JUST DIDN'T LAST AND YOU DIDN'T HAVE MONEY TO GET MORE.: NEVER TRUE

## 2024-01-21 SDOH — ECONOMIC STABILITY: INCOME INSECURITY: HOW HARD IS IT FOR YOU TO PAY FOR THE VERY BASICS LIKE FOOD, HOUSING, MEDICAL CARE, AND HEATING?: NOT HARD AT ALL

## 2024-01-21 SDOH — HEALTH STABILITY: PHYSICAL HEALTH: ON AVERAGE, HOW MANY DAYS PER WEEK DO YOU ENGAGE IN MODERATE TO STRENUOUS EXERCISE (LIKE A BRISK WALK)?: 5 DAYS

## 2024-01-21 SDOH — ECONOMIC STABILITY: HOUSING INSECURITY
IN THE LAST 12 MONTHS, WAS THERE A TIME WHEN YOU DID NOT HAVE A STEADY PLACE TO SLEEP OR SLEPT IN A SHELTER (INCLUDING NOW)?: NO

## 2024-01-21 SDOH — ECONOMIC STABILITY: TRANSPORTATION INSECURITY
IN THE PAST 12 MONTHS, HAS LACK OF RELIABLE TRANSPORTATION KEPT YOU FROM MEDICAL APPOINTMENTS, MEETINGS, WORK OR FROM GETTING THINGS NEEDED FOR DAILY LIVING?: NO

## 2024-01-21 SDOH — ECONOMIC STABILITY: FOOD INSECURITY: WITHIN THE PAST 12 MONTHS, YOU WORRIED THAT YOUR FOOD WOULD RUN OUT BEFORE YOU GOT MONEY TO BUY MORE.: NEVER TRUE

## 2024-01-21 SDOH — HEALTH STABILITY: PHYSICAL HEALTH: ON AVERAGE, HOW MANY MINUTES DO YOU ENGAGE IN EXERCISE AT THIS LEVEL?: 40 MIN

## 2024-01-21 SDOH — ECONOMIC STABILITY: TRANSPORTATION INSECURITY
IN THE PAST 12 MONTHS, HAS LACK OF TRANSPORTATION KEPT YOU FROM MEETINGS, WORK, OR FROM GETTING THINGS NEEDED FOR DAILY LIVING?: NO

## 2024-01-21 SDOH — ECONOMIC STABILITY: INCOME INSECURITY: IN THE LAST 12 MONTHS, WAS THERE A TIME WHEN YOU WERE NOT ABLE TO PAY THE MORTGAGE OR RENT ON TIME?: NO

## 2024-01-21 SDOH — HEALTH STABILITY: MENTAL HEALTH
STRESS IS WHEN SOMEONE FEELS TENSE, NERVOUS, ANXIOUS, OR CAN'T SLEEP AT NIGHT BECAUSE THEIR MIND IS TROUBLED. HOW STRESSED ARE YOU?: ONLY A LITTLE

## 2024-01-21 SDOH — ECONOMIC STABILITY: HOUSING INSECURITY: IN THE LAST 12 MONTHS, HOW MANY PLACES HAVE YOU LIVED?: 1

## 2024-01-21 ASSESSMENT — LIFESTYLE VARIABLES
SKIP TO QUESTIONS 9-10: 0
HOW OFTEN DO YOU HAVE A DRINK CONTAINING ALCOHOL: 2-4 TIMES A MONTH
HOW MANY STANDARD DRINKS CONTAINING ALCOHOL DO YOU HAVE ON A TYPICAL DAY: 3 OR 4
AUDIT-C TOTAL SCORE: 3
HOW OFTEN DO YOU HAVE SIX OR MORE DRINKS ON ONE OCCASION: NEVER

## 2024-01-21 ASSESSMENT — SOCIAL DETERMINANTS OF HEALTH (SDOH)
HOW HARD IS IT FOR YOU TO PAY FOR THE VERY BASICS LIKE FOOD, HOUSING, MEDICAL CARE, AND HEATING?: NOT HARD AT ALL
HOW OFTEN DO YOU ATTEND CHURCH OR RELIGIOUS SERVICES?: 1 TO 4 TIMES PER YEAR
HOW OFTEN DO YOU ATTENT MEETINGS OF THE CLUB OR ORGANIZATION YOU BELONG TO?: NEVER
WITHIN THE PAST 12 MONTHS, YOU WORRIED THAT YOUR FOOD WOULD RUN OUT BEFORE YOU GOT THE MONEY TO BUY MORE: NEVER TRUE
HOW OFTEN DO YOU HAVE A DRINK CONTAINING ALCOHOL: 2-4 TIMES A MONTH
HOW OFTEN DO YOU ATTEND CHURCH OR RELIGIOUS SERVICES?: 1 TO 4 TIMES PER YEAR
HOW OFTEN DO YOU GET TOGETHER WITH FRIENDS OR RELATIVES?: TWICE A WEEK
ARE YOU MARRIED, WIDOWED, DIVORCED, SEPARATED, NEVER MARRIED, OR LIVING WITH A PARTNER?: LIVING WITH PARTNER
HOW OFTEN DO YOU ATTENT MEETINGS OF THE CLUB OR ORGANIZATION YOU BELONG TO?: NEVER
IN A TYPICAL WEEK, HOW MANY TIMES DO YOU TALK ON THE PHONE WITH FAMILY, FRIENDS, OR NEIGHBORS?: MORE THAN THREE TIMES A WEEK
HOW OFTEN DO YOU HAVE SIX OR MORE DRINKS ON ONE OCCASION: NEVER
HOW MANY DRINKS CONTAINING ALCOHOL DO YOU HAVE ON A TYPICAL DAY WHEN YOU ARE DRINKING: 3 OR 4
HOW OFTEN DO YOU GET TOGETHER WITH FRIENDS OR RELATIVES?: TWICE A WEEK
DO YOU BELONG TO ANY CLUBS OR ORGANIZATIONS SUCH AS CHURCH GROUPS UNIONS, FRATERNAL OR ATHLETIC GROUPS, OR SCHOOL GROUPS?: NO
DO YOU BELONG TO ANY CLUBS OR ORGANIZATIONS SUCH AS CHURCH GROUPS UNIONS, FRATERNAL OR ATHLETIC GROUPS, OR SCHOOL GROUPS?: NO
IN A TYPICAL WEEK, HOW MANY TIMES DO YOU TALK ON THE PHONE WITH FAMILY, FRIENDS, OR NEIGHBORS?: MORE THAN THREE TIMES A WEEK
ARE YOU MARRIED, WIDOWED, DIVORCED, SEPARATED, NEVER MARRIED, OR LIVING WITH A PARTNER?: LIVING WITH PARTNER

## 2024-01-23 ENCOUNTER — APPOINTMENT (OUTPATIENT)
Dept: RADIOLOGY | Facility: IMAGING CENTER | Age: 39
End: 2024-01-23
Attending: FAMILY MEDICINE
Payer: COMMERCIAL

## 2024-01-23 ENCOUNTER — OFFICE VISIT (OUTPATIENT)
Dept: SPORTS MEDICINE | Facility: CLINIC | Age: 39
End: 2024-01-23
Payer: COMMERCIAL

## 2024-01-23 VITALS
DIASTOLIC BLOOD PRESSURE: 82 MMHG | WEIGHT: 143 LBS | BODY MASS INDEX: 23.82 KG/M2 | TEMPERATURE: 98.1 F | HEART RATE: 72 BPM | RESPIRATION RATE: 16 BRPM | SYSTOLIC BLOOD PRESSURE: 118 MMHG | HEIGHT: 65 IN | OXYGEN SATURATION: 98 %

## 2024-01-23 DIAGNOSIS — M25.561 CHRONIC PAIN OF RIGHT KNEE: ICD-10-CM

## 2024-01-23 DIAGNOSIS — Z87.39 S/P ARTHROSCOPIC RECONSTRUCTION OF ACL OF RIGHT KNEE USING QUADRICEPS TENDON AUTOGRAFT: ICD-10-CM

## 2024-01-23 DIAGNOSIS — Z98.890 S/P ARTHROSCOPIC RECONSTRUCTION OF ACL OF RIGHT KNEE USING QUADRICEPS TENDON AUTOGRAFT: ICD-10-CM

## 2024-01-23 DIAGNOSIS — G89.29 CHRONIC PAIN OF RIGHT KNEE: ICD-10-CM

## 2024-01-23 DIAGNOSIS — S83.206A MCMURRAY TEST POSITIVE, RIGHT, INITIAL ENCOUNTER: ICD-10-CM

## 2024-01-23 PROCEDURE — 99213 OFFICE O/P EST LOW 20 MIN: CPT | Performed by: FAMILY MEDICINE

## 2024-01-23 PROCEDURE — 73564 X-RAY EXAM KNEE 4 OR MORE: CPT | Mod: TC,RT | Performed by: STUDENT IN AN ORGANIZED HEALTH CARE EDUCATION/TRAINING PROGRAM

## 2024-01-23 PROCEDURE — 3079F DIAST BP 80-89 MM HG: CPT | Performed by: FAMILY MEDICINE

## 2024-01-23 PROCEDURE — 3074F SYST BP LT 130 MM HG: CPT | Performed by: FAMILY MEDICINE

## 2024-01-23 ASSESSMENT — FIBROSIS 4 INDEX: FIB4 SCORE: 0.98

## 2024-01-23 NOTE — PROGRESS NOTES
Chief Complaint   Patient presents with    Knee Pain     R knee pain      CHIEF COMPLAINT:  Saritha Leiva female presenting Self-referred for evaluation of knee pain.     Saritha Leiva is complaining of right knee pain  present for years, and worse over the past month  Pain is at the lateral knee  Quality is aching, dull and if she makes a rapid stop with her RIGHT leg down hill the pain can be sharp  Pain is radiating to RIGHT thigh and occasionally right leg   Improved with resting  Aggravated by skiing, pain can fluctuate, seems to be more severe with turning left while skiing on hard snow  ACL and meniscus repair in 2003, Spiral fracture of the RIGHT femur 2014 with hardware still in place  And then a subsequent double bundle ACL repair in 2016 since her initial ACL surgery failed after her femur injury  Prior Treatments: Physical Therapy last done back in 2016  Prior studies: Imaging has not been done in several years  Medications tried for pain include: ibuprofen (OTC) and Tylenol helps some  Mechanical Symptom history: No Locking, but there is intermittent crunching which is painful and worse with deep squat    Urgent care practitioner  Pilates, downhill skiing, mountain biking and Pel[employer] she gave up running secondary to her knee pain    REVIEW OF SYSTEMS  No Nausea, No Vomiting, No Chest Pain, No Shortness of Breath, No Dizziness, No Headache    PAST MEDICAL HISTORY:   History reviewed. No pertinent past medical history.    PMH:  has a past medical history of Acne vulgaris (3/11/2020), Anesthesia, Depression, Pain, Sleep disturbance (8/12/2020), and Stress (8/12/2020).    She has no past medical history of Addisons disease ([facility]), Adrenal disorder ([facility]), Allergy, Anemia, Anginal syndrome ([facility]), Anxiety, Arrhythmia, Arthritis, Asthma, At risk for sleep apnea, Blood clotting disorder ([facility]), Blood transfusion without reported diagnosis, Breath shortness, Bronchitis, Cancer ([facility]), Carcinoma in situ  of respiratory system, Cataract, Cold, Congestive heart failure (HCC), COPD (chronic obstructive pulmonary disease) (HCC), Coughing blood, Dental disorder, Diabetes (HCC), Disorder of thyroid, Emphysema of lung (HCC), GERD (gastroesophageal reflux disease), Glaucoma, Goiter, Gynecological disorder, Head ache, Heart murmur, Heart valve disease, Hemorrhagic disorder (HCC), Hepatitis A, Hepatitis B, Hepatitis C, High cholesterol, HIV (human immunodeficiency virus infection) (HCC), Hyperlipidemia, Hypertension, IBD (inflammatory bowel disease), Kidney disease, Meningitis, Migraine, Muscle disorder, Myocardial infarct (HCC), Osteoporosis, Pacemaker, Parathyroid disorder (HCC), Pituitary disease (HCC), Pneumonia, Pregnant, Rheumatic fever, Seizure (HCC), Sickle cell disease (HCC), Sleep apnea, Snoring, Stroke (HCC), Substance abuse (HCC), Tuberculosis, Urinary bladder disorder, Urinary incontinence, or Urinary tract infection.  MEDS:   Current Outpatient Medications:     COVID-19 mRNA Vac-Brody,Pfizer, (COMIRNATY) 30 MCG/0.3ML Suspension injection, Inject  into the shoulder, thigh, or buttocks., Disp: 0.3 mL, Rfl: 0    influenza vaccine quad (FLUZONE/FLUARIX) 0.5 ML Suspension Prefilled Syringe injection, Inject  into the shoulder, thigh, or buttocks., Disp: 0.5 mL, Rfl: 0    buPROPion (WELLBUTRIN XL) 150 MG XL tablet, Take 1 Tablet by mouth every morning., Disp: 90 Tablet, Rfl: 3    norethindrone (MICRONOR) 0.35 MG tablet, Take 1 Tablet by mouth every day., Disp: 84 Tablet, Rfl: 3    hydrOXYzine HCl (ATARAX) 50 MG Tab, TAKE 1 TABLET BY MOUTH THREE TIMES A DAY AS NEEDED FOR ANXIETY, Disp: 270 Tablet, Rfl: 1    COVID-19mRNA Bival Vacc Pfizer (PFIZER COVID-19 BIVALENT) 30 MCG/0.3ML Suspension injection, Inject  into the shoulder, thigh, or buttocks., Disp: 0.3 mL, Rfl: 0    influenza vaccine quad (FLUZONE QUADRIVALENT) 0.5 ML Suspension Prefilled Syringe injection, Inject  into the shoulder, thigh, or buttocks., Disp: 0.5  "mL, Rfl: 0    Melatonin 5 MG Chew Tab, Chew., Disp: , Rfl:     ibuprofen (MOTRIN) 600 MG Tab, Take 600 mg by mouth every 6 hours as needed., Disp: , Rfl:     acetaminophen (TYLENOL) 500 MG Tab, Take 500-1,000 mg by mouth every 6 hours as needed., Disp: , Rfl:     Cetirizine-Pseudoephedrine (ZYRTEC-D PO), Take  by mouth., Disp: , Rfl:   ALLERGIES:   Allergies   Allergen Reactions    Bactrim [Sulfamethoxazole W-Trimethoprim]      rash     SURGHX:   Past Surgical History:   Procedure Laterality Date    ACL RECONSTRUCTION W/ ANTEROLATERAL LIGAMENT REPAIR Right 3/29/2016    Procedure: RIGHT KNEE ARTHROSCOPY, ANTERIOR CRUCIATE LIGAMENT RECONSTRUCTION WITH QUADRICEPS TENDON AUTOGRAFT, ANTEROLATERAL LIGAMENT RECONSTRUCTION WITH SEMITENDINOSIS ALLOGRAFT;  Surgeon: Buddy Euceda M.D.;  Location: SURGERY [facility];  Service:     ACL RECONSTRUCTION W/ ANTEROLATERAL LIGAMENT REPAIR Right 12/22/2015    Procedure: RIGHT KNEE ARTHROSCOPY, MEDIAL AND LATERAL MENISCECTOMY ,  DEBRIDEMENT AND GRAFTING OF THE BONE TUNNELS WITH ALLOGRAFT BONE ;  Surgeon: Buddy Euceda M.D.;  Location: SURGERY [facility];  Service:     MENISCUS REPAIR  12/22/2015    Procedure: NA ;  Surgeon: Buddy Euceda M.D.;  Location: SURGERY [facility];  Service:     KNEE ARTHROSCOPY WITH BONE TUNNEL GRAFTING  12/22/2015    Procedure: NA ;  Surgeon: Buddy Euceda M.D.;  Location: SURGERY [facility];  Service:     MASTECTOMY BILATERAL SUBQ      BRCA 2  gene    REPAIR, KNEE, ACL Bilateral      SOCHX:  reports that she has never smoked. She has never used smokeless tobacco. She reports current alcohol use of about 1.2 oz of alcohol per week. She reports that she does not use drugs.  FH: Family history was reviewed, no pertinent findings to report     PHYSICAL EXAM:  /82 (BP Location: Left arm, Patient Position: Sitting, BP Cuff Size: Adult)   Pulse 72   Temp 36.7 °C (98.1 °F) (Temporal)   Resp 16   Ht 1.651 m (5' 5\")   Wt 64.9 kg (143 lb)   SpO2 98% "   BMI 23.80 kg/m²      well-developed, well-nourished in no apparent distress, alert and oriented x 3.  Gait: normal     RIGHT Knee:  Slight Varus and No Swelling  Range of Motion Intact  Trace effusion  Patellar No tenderness and no apprehension  Medial Joint Line Non-tender and NEGATIVE Joan  Lateral Joint Line Tenderness and POSITIVE Joan and positive Thessaly test  Trace Laxity with Varus stress  Trace Laxity with Valgus stress  Lachman's testing is 2+  Posterior Drawer Testing is Trace  The leg is otherwise neurovascularly intact    LEFT Knee:  Slight Varus and No Swelling   Range of Motion Intact  Trace effusion  Patellar No tenderness and no apprehension  Medial Joint Line Non-tender and NEGATIVE Joan  Lateral Joint Line Non-tender and NEGATIVE Joan  Trace Laxity with Varus stress  Trace Laxity with Valgus stress  Lachman's testing is 1+  Posterior Drawer Testing is Trace  The leg is otherwise neurovascularly intact    Additional Findings: None      1. S/P arthroscopic reconstruction of ACL of right knee using quadriceps tendon autograft  DX-KNEE COMPLETE 4+ RIGHT    Referral to Physical Therapy      2. Chronic pain of right knee  DX-KNEE COMPLETE 4+ RIGHT    Referral to Physical Therapy      3. Joan test positive, right, initial encounter  DX-KNEE COMPLETE 4+ RIGHT    Referral to Physical Therapy        present for years, and worse over the past month  Pain is at the lateral knee  Quality is aching, dull and if she makes a rapid stop with her RIGHT leg down hill the pain can be sharp  Pain is radiating to RIGHT thigh and occasionally right leg    Her RIGHT knee is pretty lax with varus and valgus movement and she does have some increased laxity with ACL testing as well  She does ski with an ACL brace, but it is old  Recommended trying a new brace to see if that helps    Also recommended formal physical therapy, recommend sports certified therapist given her specific situation  PT  referral Sports performance PT  Prescribed functional ACL brace for skiing    Return in about 6 weeks (around 3/5/2024).  To see how she is doing after 1 month of formal physical therapy                  1/23/2024 4:03 PM     HISTORY/REASON FOR EXAM:  Prior history of ACL reconstruction of the RIGHT knee x 2, history of POSITIVE lateral knee pain with skiing with POSITIVE lateral Joan's testing.     TECHNIQUE/EXAM DESCRIPTION AND NUMBER OF VIEWS:  4 views of the RIGHT knee.     COMPARISON: None     FINDINGS:  Bones: There are postsurgical changes in the distal right femur and proximal right tibia, compatible with ACL reconstruction. No acute fracture. No focal bone lesion. There is intramedullary da in the distal right femur.     Joints: There are posttraumatic degenerative changes in the medial and lateral knee joint compartments and marginal osteophytes. The patellofemoral compartment is preserved. No joint effusion or subluxation.     Soft tissues: Mild anterior right knee soft tissue swelling.     IMPRESSION:     1. No acute fracture or subluxation.  2. Mild anterior right knee soft tissue swelling.  3. Postsurgical changes of ACL reconstruction and intramedullary rodding of the distal right femur.           Exam Ended: 01/23/24  4:25 PM Last Resulted: 01/23/24  4:30 PM           taken here and reviewed by me    Self-referred    CC:  [employer].  FAX [phone]

## 2024-01-23 NOTE — Clinical Note
Marquise Brito, We saw Saritha in the sports medicine clinic, self-referred, for RIGHT knee pain. Her knee has a pretty extensive postoperative history. She is an avid ski year and is having continued pain at the lateral knee. We recommended some sport specific physical therapy, a new functional knee brace and we will see how she responds to that. Hope you are well. Respectfully,  CHEYANNE Kelly M.D. [facility] [place] [phone]

## 2024-02-05 ENCOUNTER — TELEPHONE (OUTPATIENT)
Dept: HEALTH INFORMATION MANAGEMENT | Facility: OTHER | Age: 39
End: 2024-02-05
Payer: COMMERCIAL

## 2024-04-01 ENCOUNTER — APPOINTMENT (OUTPATIENT)
Dept: RADIOLOGY | Facility: MEDICAL CENTER | Age: 39
End: 2024-04-01
Attending: OBSTETRICS & GYNECOLOGY
Payer: COMMERCIAL

## 2024-04-25 ENCOUNTER — HOSPITAL ENCOUNTER (OUTPATIENT)
Dept: RADIOLOGY | Facility: MEDICAL CENTER | Age: 39
End: 2024-04-25
Attending: OBSTETRICS & GYNECOLOGY
Payer: COMMERCIAL

## 2024-04-25 DIAGNOSIS — R10.2 PELVIC PAIN SYNDROME: ICD-10-CM

## 2024-04-25 DIAGNOSIS — Z80.3 FAMILY HISTORY OF MALIGNANT NEOPLASM OF BREAST: ICD-10-CM

## 2024-04-25 DIAGNOSIS — Z15.01 GENETIC SUSCEPTIBILITY TO MALIGNANT NEOPLASM OF BREAST: ICD-10-CM

## 2024-04-25 PROCEDURE — 76830 TRANSVAGINAL US NON-OB: CPT

## 2024-04-26 ENCOUNTER — HOSPITAL ENCOUNTER (OUTPATIENT)
Dept: LAB | Facility: MEDICAL CENTER | Age: 39
End: 2024-04-26
Attending: OBSTETRICS & GYNECOLOGY
Payer: COMMERCIAL

## 2024-04-26 ENCOUNTER — HOSPITAL ENCOUNTER (OUTPATIENT)
Dept: LAB | Facility: MEDICAL CENTER | Age: 39
End: 2024-04-26
Attending: PHYSICIAN ASSISTANT
Payer: COMMERCIAL

## 2024-04-26 DIAGNOSIS — N83.8 OVARIAN MASS, RIGHT: ICD-10-CM

## 2024-04-26 LAB
CANCER AG125 SERPL-ACNC: 14.1 U/ML (ref 0–35)
HCG SERPL QL: NEGATIVE

## 2024-04-26 PROCEDURE — 86304 IMMUNOASSAY TUMOR CA 125: CPT

## 2024-04-26 PROCEDURE — 36415 COLL VENOUS BLD VENIPUNCTURE: CPT

## 2024-04-26 PROCEDURE — 84703 CHORIONIC GONADOTROPIN ASSAY: CPT

## 2024-05-07 ENCOUNTER — HOSPITAL ENCOUNTER (OUTPATIENT)
Facility: MEDICAL CENTER | Age: 39
End: 2024-05-07
Attending: NURSE PRACTITIONER
Payer: COMMERCIAL

## 2024-05-07 DIAGNOSIS — N92.6 MISSED MENSES: ICD-10-CM

## 2024-05-07 LAB — B-HCG SERPL-ACNC: 490 MIU/ML (ref 0–5)

## 2024-05-08 ENCOUNTER — HOSPITAL ENCOUNTER (OUTPATIENT)
Dept: RADIOLOGY | Facility: MEDICAL CENTER | Age: 39
End: 2024-05-08
Attending: NURSE PRACTITIONER
Payer: COMMERCIAL

## 2024-05-08 DIAGNOSIS — N92.6 MISSED MENSES: ICD-10-CM

## 2024-05-09 ENCOUNTER — HOSPITAL ENCOUNTER (OUTPATIENT)
Dept: LAB | Facility: MEDICAL CENTER | Age: 39
End: 2024-05-09
Attending: NURSE PRACTITIONER
Payer: COMMERCIAL

## 2024-05-09 DIAGNOSIS — N92.6 MISSED MENSES: ICD-10-CM

## 2024-05-09 LAB — B-HCG SERPL-ACNC: 1218 MIU/ML (ref 0–5)

## 2024-05-24 NOTE — TELEPHONE ENCOUNTER
Notification from ipledge questioning post HCG . Order is in epic patient has not done lab work as of yet . I did LM for patient reminding her to get this done as soon as possible so we can satisfy her ipledge account .    n/a

## 2024-06-06 ENCOUNTER — HOSPITAL ENCOUNTER (OUTPATIENT)
Dept: RADIOLOGY | Facility: MEDICAL CENTER | Age: 39
End: 2024-06-06
Attending: STUDENT IN AN ORGANIZED HEALTH CARE EDUCATION/TRAINING PROGRAM
Payer: COMMERCIAL

## 2024-06-06 DIAGNOSIS — Z15.01 GENETIC SUSCEPTIBILITY TO MALIGNANT NEOPLASM OF BREAST: ICD-10-CM

## 2024-06-06 DIAGNOSIS — Z80.3 FAMILY HISTORY OF MALIGNANT NEOPLASM OF BREAST: ICD-10-CM

## 2024-06-06 DIAGNOSIS — R10.2 PELVIC AND PERINEAL PAIN: ICD-10-CM

## 2024-06-06 DIAGNOSIS — R10.2 PELVIC PAIN SYNDROME: ICD-10-CM

## 2024-06-06 PROCEDURE — 72195 MRI PELVIS W/O DYE: CPT

## 2024-06-12 ENCOUNTER — HOSPITAL ENCOUNTER (OUTPATIENT)
Facility: MEDICAL CENTER | Age: 39
End: 2024-06-12
Payer: COMMERCIAL

## 2024-06-12 PROCEDURE — 86901 BLOOD TYPING SEROLOGIC RH(D): CPT

## 2024-06-12 PROCEDURE — 86900 BLOOD TYPING SEROLOGIC ABO: CPT

## 2024-06-13 LAB
ABO GROUP BLD: NORMAL
RH BLD: NORMAL

## 2024-07-02 SDOH — HEALTH STABILITY: PHYSICAL HEALTH: ON AVERAGE, HOW MANY DAYS PER WEEK DO YOU ENGAGE IN MODERATE TO STRENUOUS EXERCISE (LIKE A BRISK WALK)?: 5 DAYS

## 2024-07-02 SDOH — ECONOMIC STABILITY: FOOD INSECURITY: WITHIN THE PAST 12 MONTHS, THE FOOD YOU BOUGHT JUST DIDN'T LAST AND YOU DIDN'T HAVE MONEY TO GET MORE.: NEVER TRUE

## 2024-07-02 SDOH — HEALTH STABILITY: PHYSICAL HEALTH: ON AVERAGE, HOW MANY MINUTES DO YOU ENGAGE IN EXERCISE AT THIS LEVEL?: 50 MIN

## 2024-07-02 SDOH — ECONOMIC STABILITY: INCOME INSECURITY: IN THE LAST 12 MONTHS, WAS THERE A TIME WHEN YOU WERE NOT ABLE TO PAY THE MORTGAGE OR RENT ON TIME?: NO

## 2024-07-02 SDOH — ECONOMIC STABILITY: FOOD INSECURITY: WITHIN THE PAST 12 MONTHS, YOU WORRIED THAT YOUR FOOD WOULD RUN OUT BEFORE YOU GOT MONEY TO BUY MORE.: NEVER TRUE

## 2024-07-02 SDOH — HEALTH STABILITY: MENTAL HEALTH
STRESS IS WHEN SOMEONE FEELS TENSE, NERVOUS, ANXIOUS, OR CAN'T SLEEP AT NIGHT BECAUSE THEIR MIND IS TROUBLED. HOW STRESSED ARE YOU?: RATHER MUCH

## 2024-07-02 SDOH — ECONOMIC STABILITY: HOUSING INSECURITY: IN THE LAST 12 MONTHS, HOW MANY PLACES HAVE YOU LIVED?: 1

## 2024-07-02 SDOH — ECONOMIC STABILITY: INCOME INSECURITY: HOW HARD IS IT FOR YOU TO PAY FOR THE VERY BASICS LIKE FOOD, HOUSING, MEDICAL CARE, AND HEATING?: NOT HARD AT ALL

## 2024-07-02 ASSESSMENT — SOCIAL DETERMINANTS OF HEALTH (SDOH)
DO YOU BELONG TO ANY CLUBS OR ORGANIZATIONS SUCH AS CHURCH GROUPS UNIONS, FRATERNAL OR ATHLETIC GROUPS, OR SCHOOL GROUPS?: NO
HOW OFTEN DO YOU GET TOGETHER WITH FRIENDS OR RELATIVES?: THREE TIMES A WEEK
HOW OFTEN DO YOU HAVE SIX OR MORE DRINKS ON ONE OCCASION: LESS THAN MONTHLY
IN A TYPICAL WEEK, HOW MANY TIMES DO YOU TALK ON THE PHONE WITH FAMILY, FRIENDS, OR NEIGHBORS?: MORE THAN THREE TIMES A WEEK
IN THE PAST 12 MONTHS, HAS THE ELECTRIC, GAS, OIL, OR WATER COMPANY THREATENED TO SHUT OFF SERVICE IN YOUR HOME?: NO
ARE YOU MARRIED, WIDOWED, DIVORCED, SEPARATED, NEVER MARRIED, OR LIVING WITH A PARTNER?: LIVING WITH PARTNER
HOW OFTEN DO YOU ATTEND CHURCH OR RELIGIOUS SERVICES?: 1 TO 4 TIMES PER YEAR
WITHIN THE PAST 12 MONTHS, YOU WORRIED THAT YOUR FOOD WOULD RUN OUT BEFORE YOU GOT THE MONEY TO BUY MORE: NEVER TRUE
HOW OFTEN DO YOU ATTENT MEETINGS OF THE CLUB OR ORGANIZATION YOU BELONG TO?: PATIENT DECLINED
ARE YOU MARRIED, WIDOWED, DIVORCED, SEPARATED, NEVER MARRIED, OR LIVING WITH A PARTNER?: LIVING WITH PARTNER
HOW HARD IS IT FOR YOU TO PAY FOR THE VERY BASICS LIKE FOOD, HOUSING, MEDICAL CARE, AND HEATING?: NOT HARD AT ALL
HOW OFTEN DO YOU ATTENT MEETINGS OF THE CLUB OR ORGANIZATION YOU BELONG TO?: PATIENT DECLINED
IN A TYPICAL WEEK, HOW MANY TIMES DO YOU TALK ON THE PHONE WITH FAMILY, FRIENDS, OR NEIGHBORS?: MORE THAN THREE TIMES A WEEK
HOW OFTEN DO YOU GET TOGETHER WITH FRIENDS OR RELATIVES?: THREE TIMES A WEEK
HOW OFTEN DO YOU ATTEND CHURCH OR RELIGIOUS SERVICES?: 1 TO 4 TIMES PER YEAR
DO YOU BELONG TO ANY CLUBS OR ORGANIZATIONS SUCH AS CHURCH GROUPS UNIONS, FRATERNAL OR ATHLETIC GROUPS, OR SCHOOL GROUPS?: NO
HOW OFTEN DO YOU HAVE A DRINK CONTAINING ALCOHOL: 2-3 TIMES A WEEK
HOW MANY DRINKS CONTAINING ALCOHOL DO YOU HAVE ON A TYPICAL DAY WHEN YOU ARE DRINKING: 3 OR 4

## 2024-07-02 ASSESSMENT — LIFESTYLE VARIABLES
SKIP TO QUESTIONS 9-10: 0
HOW OFTEN DO YOU HAVE SIX OR MORE DRINKS ON ONE OCCASION: LESS THAN MONTHLY
HOW OFTEN DO YOU HAVE A DRINK CONTAINING ALCOHOL: 2-3 TIMES A WEEK
AUDIT-C TOTAL SCORE: 5
HOW MANY STANDARD DRINKS CONTAINING ALCOHOL DO YOU HAVE ON A TYPICAL DAY: 3 OR 4

## 2024-07-03 ENCOUNTER — OFFICE VISIT (OUTPATIENT)
Dept: MEDICAL GROUP | Facility: MEDICAL CENTER | Age: 39
End: 2024-07-03
Payer: COMMERCIAL

## 2024-07-03 VITALS
OXYGEN SATURATION: 100 % | DIASTOLIC BLOOD PRESSURE: 66 MMHG | TEMPERATURE: 97.2 F | HEIGHT: 65 IN | SYSTOLIC BLOOD PRESSURE: 104 MMHG | BODY MASS INDEX: 25.79 KG/M2 | WEIGHT: 154.76 LBS | HEART RATE: 52 BPM

## 2024-07-03 DIAGNOSIS — Z76.89 ENCOUNTER TO ESTABLISH CARE: ICD-10-CM

## 2024-07-03 DIAGNOSIS — F41.1 GAD (GENERALIZED ANXIETY DISORDER): ICD-10-CM

## 2024-07-03 DIAGNOSIS — Z83.49 FAMILY HISTORY OF THYROID DISEASE: ICD-10-CM

## 2024-07-03 DIAGNOSIS — Z87.59 HISTORY OF MISCARRIAGE: ICD-10-CM

## 2024-07-03 DIAGNOSIS — Z15.01 BRCA2 GENE MUTATION POSITIVE: ICD-10-CM

## 2024-07-03 DIAGNOSIS — F33.1 MODERATE EPISODE OF RECURRENT MAJOR DEPRESSIVE DISORDER (HCC): ICD-10-CM

## 2024-07-03 DIAGNOSIS — Z15.09 BRCA2 GENE MUTATION POSITIVE: ICD-10-CM

## 2024-07-03 DIAGNOSIS — Z13.6 SCREENING FOR CARDIOVASCULAR CONDITION: ICD-10-CM

## 2024-07-03 DIAGNOSIS — Z30.41 ORAL CONTRACEPTIVE PILL SURVEILLANCE: ICD-10-CM

## 2024-07-03 DIAGNOSIS — E55.9 VITAMIN D DEFICIENCY: ICD-10-CM

## 2024-07-03 PROCEDURE — 99214 OFFICE O/P EST MOD 30 MIN: CPT | Performed by: NURSE PRACTITIONER

## 2024-07-03 PROCEDURE — 3078F DIAST BP <80 MM HG: CPT | Performed by: NURSE PRACTITIONER

## 2024-07-03 PROCEDURE — 3074F SYST BP LT 130 MM HG: CPT | Performed by: NURSE PRACTITIONER

## 2024-07-03 RX ORDER — ACETAMINOPHEN AND CODEINE PHOSPHATE 120; 12 MG/5ML; MG/5ML
1 SOLUTION ORAL DAILY
Qty: 84 TABLET | Refills: 3 | Status: SHIPPED | OUTPATIENT
Start: 2024-07-03

## 2024-07-03 RX ORDER — HYDROXYZINE HYDROCHLORIDE 25 MG/1
25 TABLET, FILM COATED ORAL NIGHTLY PRN
Qty: 90 TABLET | Refills: 3 | Status: SHIPPED | OUTPATIENT
Start: 2024-07-03

## 2024-07-03 RX ORDER — BUPROPION HYDROCHLORIDE 300 MG/1
300 TABLET ORAL EVERY MORNING
Qty: 90 TABLET | Refills: 3 | Status: SHIPPED | OUTPATIENT
Start: 2024-07-03

## 2024-07-03 RX ORDER — FLUOXETINE 10 MG/1
10 CAPSULE ORAL DAILY
Qty: 90 CAPSULE | Refills: 0 | Status: SHIPPED | OUTPATIENT
Start: 2024-07-03

## 2024-07-03 ASSESSMENT — FIBROSIS 4 INDEX: FIB4 SCORE: 0.98

## 2024-07-03 ASSESSMENT — PATIENT HEALTH QUESTIONNAIRE - PHQ9: CLINICAL INTERPRETATION OF PHQ2 SCORE: 0

## 2024-07-25 ENCOUNTER — HOSPITAL ENCOUNTER (OUTPATIENT)
Dept: LAB | Facility: MEDICAL CENTER | Age: 39
End: 2024-07-25
Attending: NURSE PRACTITIONER
Payer: COMMERCIAL

## 2024-07-25 DIAGNOSIS — E55.9 VITAMIN D DEFICIENCY: ICD-10-CM

## 2024-07-25 DIAGNOSIS — Z13.6 SCREENING FOR CARDIOVASCULAR CONDITION: ICD-10-CM

## 2024-07-25 DIAGNOSIS — Z83.49 FAMILY HISTORY OF THYROID DISEASE: ICD-10-CM

## 2024-07-25 DIAGNOSIS — Z87.59 HISTORY OF MISCARRIAGE: ICD-10-CM

## 2024-07-25 DIAGNOSIS — F33.1 MODERATE EPISODE OF RECURRENT MAJOR DEPRESSIVE DISORDER (HCC): ICD-10-CM

## 2024-07-25 DIAGNOSIS — F41.1 GAD (GENERALIZED ANXIETY DISORDER): ICD-10-CM

## 2024-07-25 LAB
25(OH)D3 SERPL-MCNC: 42 NG/ML (ref 30–100)
ALBUMIN SERPL BCP-MCNC: 4.6 G/DL (ref 3.2–4.9)
ALBUMIN/GLOB SERPL: 1.9 G/DL
ALP SERPL-CCNC: 66 U/L (ref 30–99)
ALT SERPL-CCNC: 41 U/L (ref 2–50)
ANION GAP SERPL CALC-SCNC: 13 MMOL/L (ref 7–16)
AST SERPL-CCNC: 34 U/L (ref 12–45)
BILIRUB SERPL-MCNC: 0.7 MG/DL (ref 0.1–1.5)
BUN SERPL-MCNC: 17 MG/DL (ref 8–22)
CALCIUM ALBUM COR SERPL-MCNC: 9 MG/DL (ref 8.5–10.5)
CALCIUM SERPL-MCNC: 9.5 MG/DL (ref 8.5–10.5)
CHLORIDE SERPL-SCNC: 102 MMOL/L (ref 96–112)
CHOLEST SERPL-MCNC: 179 MG/DL (ref 100–199)
CO2 SERPL-SCNC: 23 MMOL/L (ref 20–33)
CREAT SERPL-MCNC: 0.85 MG/DL (ref 0.5–1.4)
GFR SERPLBLD CREATININE-BSD FMLA CKD-EPI: 91 ML/MIN/1.73 M 2
GLOBULIN SER CALC-MCNC: 2.4 G/DL (ref 1.9–3.5)
GLUCOSE SERPL-MCNC: 78 MG/DL (ref 65–99)
HDLC SERPL-MCNC: 100 MG/DL
LDLC SERPL CALC-MCNC: 68 MG/DL
POTASSIUM SERPL-SCNC: 4.6 MMOL/L (ref 3.6–5.5)
PROT SERPL-MCNC: 7 G/DL (ref 6–8.2)
SODIUM SERPL-SCNC: 138 MMOL/L (ref 135–145)
THYROPEROXIDASE AB SERPL-ACNC: 9 IU/ML (ref 0–9)
TRIGL SERPL-MCNC: 54 MG/DL (ref 0–149)
TSH SERPL-ACNC: 2.63 UIU/ML (ref 0.35–5.5)

## 2024-07-25 PROCEDURE — 86800 THYROGLOBULIN ANTIBODY: CPT

## 2024-07-25 PROCEDURE — 82306 VITAMIN D 25 HYDROXY: CPT

## 2024-07-25 PROCEDURE — 36415 COLL VENOUS BLD VENIPUNCTURE: CPT

## 2024-07-25 PROCEDURE — 80053 COMPREHEN METABOLIC PANEL: CPT

## 2024-07-25 PROCEDURE — 86376 MICROSOMAL ANTIBODY EACH: CPT

## 2024-07-25 PROCEDURE — 84443 ASSAY THYROID STIM HORMONE: CPT

## 2024-07-25 PROCEDURE — 80061 LIPID PANEL: CPT

## 2024-07-26 LAB — THYROGLOB AB SERPL-ACNC: <0.9 IU/ML (ref 0–4)

## 2024-09-26 ENCOUNTER — APPOINTMENT (OUTPATIENT)
Dept: OCCUPATIONAL MEDICINE | Facility: CLINIC | Age: 39
End: 2024-09-26

## 2024-09-26 DIAGNOSIS — Z23 NEED FOR VACCINATION: Primary | ICD-10-CM

## 2024-09-26 PROCEDURE — 90656 IIV3 VACC NO PRSV 0.5 ML IM: CPT | Performed by: PREVENTIVE MEDICINE

## 2024-09-28 DIAGNOSIS — F41.1 GAD (GENERALIZED ANXIETY DISORDER): ICD-10-CM

## 2024-09-28 DIAGNOSIS — F33.1 MODERATE EPISODE OF RECURRENT MAJOR DEPRESSIVE DISORDER (HCC): ICD-10-CM

## 2024-10-02 RX ORDER — FLUOXETINE 10 MG/1
10 CAPSULE ORAL DAILY
Qty: 90 CAPSULE | Refills: 3 | Status: SHIPPED | OUTPATIENT
Start: 2024-10-02

## 2024-12-06 ENCOUNTER — HOSPITAL ENCOUNTER (OUTPATIENT)
Dept: RADIOLOGY | Facility: MEDICAL CENTER | Age: 39
End: 2024-12-06
Attending: ORTHOPAEDIC SURGERY
Payer: COMMERCIAL

## 2024-12-06 DIAGNOSIS — S83.511S SPRAIN OF ANTERIOR CRUCIATE LIGAMENT OF RIGHT KNEE, SEQUELA: ICD-10-CM

## 2024-12-06 PROCEDURE — 73721 MRI JNT OF LWR EXTRE W/O DYE: CPT | Mod: RT

## 2024-12-07 ENCOUNTER — HOSPITAL ENCOUNTER (OUTPATIENT)
Dept: RADIOLOGY | Facility: MEDICAL CENTER | Age: 39
End: 2024-12-07
Attending: PHYSICIAN ASSISTANT
Payer: COMMERCIAL

## 2024-12-07 DIAGNOSIS — Z80.3 FAMILY HISTORY OF MALIGNANT NEOPLASM OF BREAST: ICD-10-CM

## 2024-12-07 DIAGNOSIS — Z15.01 GENETIC SUSCEPTIBILITY TO MALIGNANT NEOPLASM OF BREAST: ICD-10-CM

## 2024-12-07 DIAGNOSIS — R10.2 PELVIC PAIN SYNDROME: ICD-10-CM

## 2024-12-07 PROCEDURE — 76830 TRANSVAGINAL US NON-OB: CPT

## 2024-12-17 ENCOUNTER — HOSPITAL ENCOUNTER (OUTPATIENT)
Dept: LAB | Facility: MEDICAL CENTER | Age: 39
End: 2024-12-17
Attending: OBSTETRICS & GYNECOLOGY
Payer: COMMERCIAL

## 2024-12-17 DIAGNOSIS — Z12.73 SCREENING FOR OVARIAN CANCER: ICD-10-CM

## 2024-12-17 LAB — CANCER AG125 SERPL-ACNC: 12.5 U/ML (ref 0–35)

## 2024-12-17 PROCEDURE — 86304 IMMUNOASSAY TUMOR CA 125: CPT

## 2024-12-17 PROCEDURE — 36415 COLL VENOUS BLD VENIPUNCTURE: CPT

## 2025-01-20 ENCOUNTER — OFFICE VISIT (OUTPATIENT)
Dept: MEDICAL GROUP | Facility: MEDICAL CENTER | Age: 40
End: 2025-01-20
Payer: COMMERCIAL

## 2025-01-20 VITALS
TEMPERATURE: 97.6 F | RESPIRATION RATE: 16 BRPM | BODY MASS INDEX: 25.16 KG/M2 | HEART RATE: 62 BPM | SYSTOLIC BLOOD PRESSURE: 114 MMHG | WEIGHT: 151 LBS | OXYGEN SATURATION: 97 % | HEIGHT: 65 IN | DIASTOLIC BLOOD PRESSURE: 68 MMHG

## 2025-01-20 DIAGNOSIS — G47.00 INSOMNIA, UNSPECIFIED TYPE: ICD-10-CM

## 2025-01-20 DIAGNOSIS — Z29.89 NEED FOR MALARIA PROPHYLAXIS: ICD-10-CM

## 2025-01-20 DIAGNOSIS — Z71.84 TRAVEL ADVICE ENCOUNTER: ICD-10-CM

## 2025-01-20 PROCEDURE — 99213 OFFICE O/P EST LOW 20 MIN: CPT | Performed by: NURSE PRACTITIONER

## 2025-01-20 PROCEDURE — 3074F SYST BP LT 130 MM HG: CPT | Performed by: NURSE PRACTITIONER

## 2025-01-20 PROCEDURE — 3078F DIAST BP <80 MM HG: CPT | Performed by: NURSE PRACTITIONER

## 2025-01-20 RX ORDER — ATOVAQUONE AND PROGUANIL HYDROCHLORIDE 250; 100 MG/1; MG/1
TABLET, FILM COATED ORAL
Qty: 20 TABLET | Refills: 0 | Status: SHIPPED | OUTPATIENT
Start: 2025-01-20

## 2025-01-20 RX ORDER — ZOLPIDEM TARTRATE 5 MG/1
5 TABLET ORAL NIGHTLY PRN
Qty: 14 TABLET | Refills: 0 | Status: SHIPPED | OUTPATIENT
Start: 2025-01-20 | End: 2025-02-03

## 2025-01-20 ASSESSMENT — PATIENT HEALTH QUESTIONNAIRE - PHQ9: CLINICAL INTERPRETATION OF PHQ2 SCORE: 0

## 2025-01-20 NOTE — PROGRESS NOTES
Subjective:     Saritha Leiva is a 39 y.o. female presents to discuss:   Chief Complaint   Patient presents with    Travel Consult     Going to [facility], discuss malaria      Verbal consent was acquired by the patient to use Fl[employer] ambient listening note generation during this visit Yes   History of Present Illness  The patient presents for a travel consultation.    She has plans to travel to [place] at the end of March 2024, extending into April 2024. She has been researching information from the [facility] website. She has a history of severe reactions to mosquito bites, which cause significant swelling. She is current with her Tdap and influenza vaccinations and has previously received the hepatitis A and B vaccines. Her travel itinerary includes a week-long stay in [facility], followed by a visit to the [facility] for approximately 5 to 6 days. She is seeking advice on malaria prevention and is considering the use of doxycycline or Malarone. Additionally, she is requesting a prescription for a sleep aid to manage discomfort during her flight, as she has a da in her leg from a previous femur fracture, which causes pain after prolonged sitting. She has previously used Xanax and lorazepam for sleep and found them effective.    IMMUNIZATIONS  She is up to date on her Tdap and influenza vaccines. She has had hepatitis A and B vaccines.      ROS: : see above      Current Outpatient Medications:     atovaquone-proguanil (MALARONE) 250-100 MG per tablet, Start taking one tablet by mouth 1-2 days before travel to malarious areas. Then, take one tablet daily at the same time daily while in malarious areas. Continue daily for 7 days after leaving area., Disp: 20 Tablet, Rfl: 0    zolpidem (AMBIEN) 5 MG Tab, Take 1 Tablet by mouth at bedtime as needed for Sleep for up to 14 days., Disp: 14 Tablet, Rfl: 0    FLUoxetine (PROZAC) 10 MG Cap, TAKE 1 CAPSULE BY MOUTH EVERY DAY, Disp: 90 Capsule, Rfl: 3    norethindrone  "(MICRONOR) 0.35 MG tablet, Take 1 Tablet by mouth every day., Disp: 84 Tablet, Rfl: 3    hydrOXYzine HCl (ATARAX) 25 MG Tab, Take 1 Tablet by mouth at bedtime as needed for Anxiety (sleep)., Disp: 90 Tablet, Rfl: 3    buPROPion (WELLBUTRIN XL) 300 MG XL tablet, Take 1 Tablet by mouth every morning., Disp: 90 Tablet, Rfl: 3    Melatonin 5 MG Chew Tab, Chew., Disp: , Rfl:     ibuprofen (MOTRIN) 600 MG Tab, Take 600 mg by mouth every 6 hours as needed., Disp: , Rfl:     acetaminophen (TYLENOL) 500 MG Tab, Take 500-1,000 mg by mouth every 6 hours as needed., Disp: , Rfl:     Allergies   Allergen Reactions    Bactrim [Sulfamethoxazole W-Trimethoprim]      rash    Melissa-Kit Bee Sting      Other Reaction(s): Not available    Bee Venom Anaphylaxis       Objective:     Vitals: /68   Pulse 62   Temp 36.4 °C (97.6 °F) (Temporal)   Resp 16   Ht 1.651 m (5' 5\")   Wt 68.5 kg (151 lb)   LMP 01/18/2025 (Approximate)   SpO2 97%   BMI 25.13 kg/m²    General: Alert, pleasant, NAD  HEENT: Normocephalic.  Neck supple.   Respiratory: no distress, no audible wheezing, RR -WNL  Skin: Warm, dry, no rashes.  Extremities: No leg edema. No discoloration  Neurological: No tremors  Psych:  Affect/mood is normal, judgement is good, memory is intact, grooming is appropriate.      Assessment/Plan:      1. Travel advice encounter  - atovaquone-proguanil (MALARONE) 250-100 MG per tablet; Start taking one tablet by mouth 1-2 days before travel to malarious areas. Then, take one tablet daily at the same time daily while in malarious areas. Continue daily for 7 days after leaving area.  Dispense: 20 Tablet; Refill: 0    2. Need for malaria prophylaxis  - atovaquone-proguanil (MALARONE) 250-100 MG per tablet; Start taking one tablet by mouth 1-2 days before travel to malarious areas. Then, take one tablet daily at the same time daily while in malarious areas. Continue daily for 7 days after leaving area.  Dispense: 20 Tablet; Refill: 0    3. " Insomnia, unspecified type  - zolpidem (AMBIEN) 5 MG Tab; Take 1 Tablet by mouth at bedtime as needed for Sleep for up to 14 days.  Dispense: 14 Tablet; Refill: 0       Assessment & Plan  1. Travel consultation - Advised on necessary precautions and medications for upcoming trip to [place]  - Carry allergy medications and Pepcid  - Avoid contact with dogs to prevent rabies exposure  - Prescription for Malarone provided, to start 2 days before entering malaria-endemic area and continue for 7 days post-exposure  - 20 tablets of Malarone prescribed  - Prescription for Ambien 5 mg provided, with 14 tablets to manage sleep during the flight    Return if symptoms worsen or fail to improve.    {I have placed the above orders and discussed them with an approved delegating provider. The MA is performing the below orders under the direction of Dr. Ld RAZA

## 2025-01-22 ENCOUNTER — HOSPITAL ENCOUNTER (OUTPATIENT)
Dept: RADIOLOGY | Facility: MEDICAL CENTER | Age: 40
End: 2025-01-22
Attending: STUDENT IN AN ORGANIZED HEALTH CARE EDUCATION/TRAINING PROGRAM
Payer: COMMERCIAL

## 2025-01-22 DIAGNOSIS — R10.2 PELVIC AND PERINEAL PAIN: ICD-10-CM

## 2025-01-22 DIAGNOSIS — Z80.3 FAMILY HISTORY OF MALIGNANT NEOPLASM OF BREAST: ICD-10-CM

## 2025-01-22 DIAGNOSIS — Z15.01 GENETIC SUSCEPTIBILITY TO MALIGNANT NEOPLASM OF BREAST: ICD-10-CM

## 2025-01-22 PROCEDURE — 76830 TRANSVAGINAL US NON-OB: CPT

## 2025-02-26 RX ORDER — ONDANSETRON 4 MG/1
4 TABLET, ORALLY DISINTEGRATING ORAL EVERY 6 HOURS PRN
Qty: 20 TABLET | Refills: 0 | Status: SHIPPED | OUTPATIENT
Start: 2025-02-26

## 2025-03-21 DIAGNOSIS — T14.8XXA MUSCLE STRAIN: ICD-10-CM

## 2025-03-21 RX ORDER — CYCLOBENZAPRINE HCL 5 MG
5-10 TABLET ORAL 3 TIMES DAILY PRN
Qty: 90 TABLET | Refills: 0 | Status: SHIPPED | OUTPATIENT
Start: 2025-03-21

## 2025-07-06 DIAGNOSIS — F41.1 GAD (GENERALIZED ANXIETY DISORDER): ICD-10-CM

## 2025-07-06 DIAGNOSIS — F33.1 MODERATE EPISODE OF RECURRENT MAJOR DEPRESSIVE DISORDER (HCC): ICD-10-CM

## 2025-07-09 ENCOUNTER — HOSPITAL ENCOUNTER (OUTPATIENT)
Dept: RADIOLOGY | Facility: MEDICAL CENTER | Age: 40
End: 2025-07-09
Attending: STUDENT IN AN ORGANIZED HEALTH CARE EDUCATION/TRAINING PROGRAM
Payer: COMMERCIAL

## 2025-07-09 DIAGNOSIS — R10.2 PELVIC AND PERINEAL PAIN: ICD-10-CM

## 2025-07-09 DIAGNOSIS — Z80.3 FAMILY HISTORY OF MALIGNANT NEOPLASM OF BREAST: ICD-10-CM

## 2025-07-09 DIAGNOSIS — Z15.01 GENETIC SUSCEPTIBILITY TO MALIGNANT NEOPLASM OF BREAST: ICD-10-CM

## 2025-07-09 PROCEDURE — 76830 TRANSVAGINAL US NON-OB: CPT

## 2025-07-10 DIAGNOSIS — F41.1 GAD (GENERALIZED ANXIETY DISORDER): ICD-10-CM

## 2025-07-12 RX ORDER — HYDROXYZINE HYDROCHLORIDE 25 MG/1
25 TABLET, FILM COATED ORAL NIGHTLY PRN
Qty: 90 TABLET | Refills: 3 | Status: SHIPPED | OUTPATIENT
Start: 2025-07-12

## 2025-07-12 RX ORDER — BUPROPION HYDROCHLORIDE 300 MG/1
300 TABLET ORAL EVERY MORNING
Qty: 90 TABLET | Refills: 3 | Status: SHIPPED | OUTPATIENT
Start: 2025-07-12

## 2025-07-17 ENCOUNTER — HOSPITAL ENCOUNTER (OUTPATIENT)
Dept: LAB | Facility: MEDICAL CENTER | Age: 40
End: 2025-07-17
Payer: COMMERCIAL

## 2025-07-17 LAB — CANCER AG125 SERPL-ACNC: 11.6 U/ML (ref 0–35)

## 2025-07-17 PROCEDURE — 86304 IMMUNOASSAY TUMOR CA 125: CPT

## 2025-07-17 PROCEDURE — 36415 COLL VENOUS BLD VENIPUNCTURE: CPT

## 2025-10-29 ENCOUNTER — APPOINTMENT (OUTPATIENT)
Dept: MEDICAL GROUP | Facility: MEDICAL CENTER | Age: 40
End: 2025-10-29
Payer: COMMERCIAL